# Patient Record
Sex: MALE | Race: WHITE | Employment: UNEMPLOYED | URBAN - METROPOLITAN AREA
[De-identification: names, ages, dates, MRNs, and addresses within clinical notes are randomized per-mention and may not be internally consistent; named-entity substitution may affect disease eponyms.]

---

## 2017-02-17 ENCOUNTER — OFFICE VISIT (OUTPATIENT)
Dept: ENDOCRINOLOGY | Age: 47
End: 2017-02-17

## 2017-02-17 VITALS
HEART RATE: 90 BPM | HEIGHT: 72 IN | BODY MASS INDEX: 38.36 KG/M2 | WEIGHT: 283.2 LBS | DIASTOLIC BLOOD PRESSURE: 88 MMHG | SYSTOLIC BLOOD PRESSURE: 128 MMHG

## 2017-02-17 DIAGNOSIS — E66.01 MORBID OBESITY WITH BMI OF 40.0-44.9, ADULT (HCC): ICD-10-CM

## 2017-02-17 DIAGNOSIS — I10 ESSENTIAL HYPERTENSION, BENIGN: ICD-10-CM

## 2017-02-17 DIAGNOSIS — E78.1 HIGH TRIGLYCERIDES: ICD-10-CM

## 2017-02-17 LAB — HBA1C MFR BLD HPLC: 10.8 %

## 2017-02-17 NOTE — PROGRESS NOTES
Chief Complaint   Patient presents with    Diabetes     pcp and pharmacy confirmed    Other     eye exam is due    Labs     done     History of Present Illness: Pascale Tariq is a 40 y.o. male here for follow up of diabetes. Weight down 23 lbs since last visit in 9/16. Has been back on phentermine 1 tab every morning and may miss a rare dose and has felt this has helped with appetite and energy and mental sharpness. Has been getting both dose of metformin every morning. Was out of work from [facility] until 2/1/17 and while out of work was a little better at getting the evening dose but still only got this 50% of the time. Since being back to work he gets up at 1am and is only sleeping about 4 hours of sleep per night. Hasn't not checked sugar at all in the past few weeks and when he did check it a few times it was in the 200s. Does tend to take 100 units with his morning dose and will take this amount if he remembers his evening dose. Current Outpatient Prescriptions   Medication Sig    lansoprazole (PREVACID) 30 mg capsule TAKE ONE CAPSULE TWICE A DAY BEFORE BREAKFAST AND SUPPER FOR REFLUX    atenolol (TENORMIN) 50 mg tablet TAKE 1 TABLET BY MOUTH DAILY    phentermine (ADIPEX-P) 37.5 mg tablet Take 1 Tab by mouth every morning. Max Daily Amount: 37.5 mg.    indomethacin (INDOCIN) 50 mg capsule TAKE 1 CAPSULE BY MOUTH 3 TIMES DAILY WITH FOOD FOR JOINT PAIN AND INFLAMMATION    NOVOLIN 70/30 100 unit/mL (70-30) injection INJECT 75 UNITS TWO TIMES  A DAY OR AS DIRECTED UP  UNITS DAILY    Insulin Syringe-Needle U-100 (BD INSULIN SYRINGE ULTRA-FINE) 1 mL 31 x 5/16\" syrg USE AS DIRECTED TWICE DAILY    febuxostat (ULORIC) 40 mg tab tablet TAKE 1 TAB BY MOUTH DAILY.  metFORMIN ER (GLUCOPHAGE XR) 500 mg tablet TAKE 1 TABLET TWICE A DAY    ONETOUCH VERIO strip USE TO TEST BLOOD SUGAR TWICE DAILY AS DIRECTED     No current facility-administered medications for this visit.       Allergies Allergen Reactions    Penicillin G Anaphylaxis     \"almost killed me as a child according to my grandma (a nurse)\"     Review of Systems:  - Eyes: no blurry vision or double vision  - Cardiovascular: no chest pain  - Respiratory: no shortness of breath  - Musculoskeletal: no myalgias  - Neurological: no numbness/tingling in extremities    Physical Examination:  Blood pressure 128/88, pulse 90, height 6' (1.829 m), weight 283 lb 3.2 oz (128.5 kg). - General: pleasant, no distress, good eye contact   - Neck: no carotid bruits  - Cardiovascular: regular, normal rate, nl s1 and s2, no m/r/g,   - Respiratory: clear bilaterally  - Integumentary: no edema,   - Psychiatric: normal mood and affect    Data Reviewed:   Component      Latest Ref Rng & Units 2/17/2017          10:40 AM   Hemoglobin A1c (POC)      % 10.8       Assessment/Plan:     1. DM w/o complication type II, uncontrolled: his most recent Hgb A1c was 10.8% in 2/17 down from 12.2% in 9/16 up from 11.9% in 8/16 up from 9.7% in 4/16 down from 9.9% in 4/15 up from 9.3% in 12/14 down from 10.6% in 8/14 up from 8.4% in 4/14 down from 11.2% in 10/13 up from 7.3% in 3/13 down from 7.7% in 1/13 down from 10.2% in August 2012. A1c is still high due to non-compliance with evening dose of insulin even though he has lost 23 lbs so will focus on this. - cont novolin 70/30 insulin 100 units bid   - cont metformin  mg 2 tabs daily  - check bs 2x daily  - optho UTD 6/12  - foot exam done 4/16  - microalbumin nl 10/12, up to 75 in 4/15, down to 13 in 4/16  - check A1c and microalbumin at next visit      2. Hypertension: his BP was at goal < 140/90  - cont atenolol 50 mg daily      3. High triglycerides: Given DM, Goal LDL < 100, non-HDL < 130, and TG < 150.  and non- in 1/13,  and non- in 10/13,  and non- in 4/14, non- in 12/14.  and LDL 81 in 12/14.  in 4/15. LDL 75 and  in 4/16.   LDL 56 and TG 307 in 9/16. Hopefully weight loss has helped  - diet control for now  - check lipids and cmp today and at next visit      4. Obesity: Had lost 36 lbs so far since June 2013 on phentermine but weight up 10 lbs since 8/14. I restarted in 9/16 and wt down 23 lbs by 2/17  - cont phentermine 37.5 mg daily    Patient Instructions   1) Your Hemoglobin A1c is a 3 month marker of your diabetes control. Goal is less than 7% which means your average blood sugar is less than 150. Your Hemoglobin A1c is 10.8% which means your diabetes is under better control than 12.2% at your last check. Continue to work on your diet and exercise and take all your medications (metformin and insulin) as directed. 2) Your have lost 23 lbs since last visit. Keep up the good work. 3) Do your best to get both shots of 100 units everyday to keep your A1c down. 4) I will send you a message through [employer] with your lab results. 5) Your blood pressure was at goal.          Follow-up Disposition:  Return in about 5 months (around 7/17/2017). Copy sent to:  Dr. Dorie Santos via [facility]  Dr. Allie Carmona    Lab follow up: 2/18/17    Component      Latest Ref Rng & Units 2/17/2017 2/17/2017          12:37 PM 12:37 PM   Glucose      65 - 99 mg/dL  318 (H)   BUN      6 - 24 mg/dL  10   Creatinine      0.76 - 1.27 mg/dL  0.77   GFR est non-AA      >59 mL/min/1.73  110   GFR est AA      >59 mL/min/1.73  127   BUN/Creatinine ratio      9 - 20  13   Sodium      134 - 144 mmol/L  138   Potassium      3.5 - 5.2 mmol/L  5.2   Chloride      96 - 106 mmol/L  95 (L)   CO2      18 - 29 mmol/L  27   Calcium      8.7 - 10.2 mg/dL  9.4   Protein, total      6.0 - 8.5 g/dL  6.7   Albumin      3.5 - 5.5 g/dL  4.1   GLOBULIN, TOTAL      1.5 - 4.5 g/dL  2.6   A-G Ratio      1.1 - 2.5  1.6   Bilirubin, total      0.0 - 1.2 mg/dL  0.3   Alk.  phosphatase      39 - 117 IU/L  91   AST      0 - 40 IU/L  29   ALT      0 - 44 IU/L  35   Cholesterol, total 100 - 199 mg/dL 131    Triglyceride      0 - 149 mg/dL 255 (H)    HDL Cholesterol      >39 mg/dL 27 (L)    VLDL, calculated      5 - 40 mg/dL 51 (H)    LDL, calculated      0 - 99 mg/dL 53      Sent him the following message through [employer]:    Total Cholesterol is the total number of cholesterol particles in your blood. Goal is less than 200. Your value is at goal.    Triglycerides are the short term fats in your blood. Goal is less than 150. Your value is above goal.    HDL is the good cholesterol in your blood. Goal is more than 40. Your value is below goal.    LDL is the bad cholesterol in your blood. Goal is less than 100. Your value is at goal.    Continue to follow a low cholesterol diet. Try to limit the amount of fried foods, fatty foods, butter, gravy, red meat, ice cream, cheese, and eggs in your diet, which are all high in cholesterol.   -------------------------------------------------------------------------------------------------------------------  BUN and creatinine are markers of kidney function. Your values are normal.  -------------------------------------------------------------------------------------------------------------------  ALT and AST are markers of liver function.   Your values are normal.

## 2017-02-17 NOTE — PATIENT INSTRUCTIONS
1) Your Hemoglobin A1c is a 3 month marker of your diabetes control. Goal is less than 7% which means your average blood sugar is less than 150. Your Hemoglobin A1c is 10.8% which means your diabetes is under better control than 12.2% at your last check. Continue to work on your diet and exercise and take all your medications (metformin and insulin) as directed. 2) Your have lost 23 lbs since last visit. Keep up the good work. 3) Do your best to get both shots of 100 units everyday to keep your A1c down. 4) I will send you a message through [employer] with your lab results.     5) Your blood pressure was at goal.

## 2017-02-17 NOTE — LETTER
2/17/2017 11:12 AM 
 
Mr. Dejah Garcia [address] [facility] [address] February 17, 2017 RE: Dejah Garcia To Whom It May Concern, This is to certify that Dejah Garcia was seen in the office today for an appointment. Please excuse his absence. Please feel free to contact my office at [phone] if you have any questions or concerns. Thank you for your assistance in this matter.  
 
 
Sincerely, 
 
 
 
Colvin Gaucher, MD

## 2017-02-18 LAB
ALBUMIN SERPL-MCNC: 4.1 G/DL (ref 3.5–5.5)
ALBUMIN/GLOB SERPL: 1.6 {RATIO} (ref 1.1–2.5)
ALP SERPL-CCNC: 91 IU/L (ref 39–117)
ALT SERPL-CCNC: 35 IU/L (ref 0–44)
AST SERPL-CCNC: 29 IU/L (ref 0–40)
BILIRUB SERPL-MCNC: 0.3 MG/DL (ref 0–1.2)
BUN SERPL-MCNC: 10 MG/DL (ref 6–24)
BUN/CREAT SERPL: 13 (ref 9–20)
CALCIUM SERPL-MCNC: 9.4 MG/DL (ref 8.7–10.2)
CHLORIDE SERPL-SCNC: 95 MMOL/L (ref 96–106)
CHOLEST SERPL-MCNC: 131 MG/DL (ref 100–199)
CO2 SERPL-SCNC: 27 MMOL/L (ref 18–29)
CREAT SERPL-MCNC: 0.77 MG/DL (ref 0.76–1.27)
GLOBULIN SER CALC-MCNC: 2.6 G/DL (ref 1.5–4.5)
GLUCOSE SERPL-MCNC: 318 MG/DL (ref 65–99)
HDLC SERPL-MCNC: 27 MG/DL
INTERPRETATION, 910389: NORMAL
LDLC SERPL CALC-MCNC: 53 MG/DL (ref 0–99)
Lab: NORMAL
POTASSIUM SERPL-SCNC: 5.2 MMOL/L (ref 3.5–5.2)
PROT SERPL-MCNC: 6.7 G/DL (ref 6–8.5)
SODIUM SERPL-SCNC: 138 MMOL/L (ref 134–144)
TRIGL SERPL-MCNC: 255 MG/DL (ref 0–149)
VLDLC SERPL CALC-MCNC: 51 MG/DL (ref 5–40)

## 2017-02-22 RX ORDER — INDOMETHACIN 50 MG/1
50 CAPSULE ORAL
Qty: 90 CAP | Refills: 1 | Status: SHIPPED | OUTPATIENT
Start: 2017-02-22 | End: 2017-09-25 | Stop reason: SDUPTHER

## 2017-06-12 DIAGNOSIS — I10 ESSENTIAL HYPERTENSION, BENIGN: ICD-10-CM

## 2017-06-12 RX ORDER — ATENOLOL 50 MG/1
50 TABLET ORAL DAILY
Qty: 90 TAB | Refills: 3 | Status: SHIPPED | OUTPATIENT
Start: 2017-06-12 | End: 2018-06-09 | Stop reason: SDUPTHER

## 2017-06-14 ENCOUNTER — TELEPHONE (OUTPATIENT)
Dept: FAMILY MEDICINE CLINIC | Age: 47
End: 2017-06-14

## 2017-06-16 ENCOUNTER — OFFICE VISIT (OUTPATIENT)
Dept: FAMILY MEDICINE CLINIC | Age: 47
End: 2017-06-16

## 2017-06-16 VITALS
SYSTOLIC BLOOD PRESSURE: 133 MMHG | BODY MASS INDEX: 39.28 KG/M2 | TEMPERATURE: 98 F | DIASTOLIC BLOOD PRESSURE: 87 MMHG | OXYGEN SATURATION: 96 % | HEART RATE: 101 BPM | WEIGHT: 290 LBS | RESPIRATION RATE: 14 BRPM | HEIGHT: 72 IN

## 2017-06-16 DIAGNOSIS — K58.9 COLON SPASM: ICD-10-CM

## 2017-06-16 DIAGNOSIS — M54.50 ACUTE LEFT-SIDED LOW BACK PAIN WITHOUT SCIATICA: Primary | ICD-10-CM

## 2017-06-16 DIAGNOSIS — C64.2 RENAL CELL CANCER, LEFT (HCC): ICD-10-CM

## 2017-06-16 DIAGNOSIS — E66.9 OBESITY, UNSPECIFIED OBESITY SEVERITY, UNSPECIFIED OBESITY TYPE: ICD-10-CM

## 2017-06-16 RX ORDER — DICYCLOMINE HYDROCHLORIDE 10 MG/1
10 CAPSULE ORAL 3 TIMES DAILY
Qty: 90 CAP | Refills: 1 | Status: SHIPPED | OUTPATIENT
Start: 2017-06-16 | End: 2017-07-18

## 2017-06-16 NOTE — PROGRESS NOTES
HPI  Rochelle Heller is a 40 y.o. male who presents for back pain that is unlike his usual low back pain. This is left-sided, more lateral, stabbing. It only happens after he eats dinner. It happens consistently after he eats dinner every evening. Describes it as a stabbing pain that makes him want to bend over. When asked about his diet he said the same thing that he said last time \"normal stuff like hamburgers, chicken tenders, steak, fries\". Went on to say that he eats a lot of bread, a lot of potatoes. He came in for a GI complaints in December that I diagnosis constipation recommended that he take Metamucil fiber supplement. This worked well and resolved his constipation complaint. His bowel pattern recently has been more on the loose stool side. Has the urge to have bowel movement just about every time he eats. Finds that he needs to run to the bathroom. It is watery with some chunks. 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 PMHx:  Past Medical History:   Diagnosis Date    Anxiety 2/23/2010    Arthritis     right knee, kain ankle/feet    Chronic pain     low back    Diabetes (HCC)     Elevated LFT's 2/23/2010    GERD (gastroesophageal reflux disease)     Gout 9/5/2009    High triglycerides 10/24/2012    Hypertension 10/8/2012    Knee pain 2/23/2010    Left kidney mass 2-7-13    noted on CT scan @ [facility]    Morbid obesity ([facility] [address]     ESTHER (obstructive sleep apnea)     Pneumonia as a child    Psychiatric disorder     Renal cell cancer ([facility] [address] 3/2013    s/p partial left nephrectomy    Type II or unspecified type diabetes mellitus without mention of complication, uncontrolled     Unspecified sleep apnea     uses C-PAP       Meds:   Current Outpatient Prescriptions   Medication Sig Dispense Refill    dicyclomine (BENTYL) 10 mg capsule Take 1 Cap by mouth three (3) times daily. 90 Cap 1    atenolol (TENORMIN) 50 mg tablet Take 1 Tab by mouth daily. 90 Tab 3    indomethacin (INDOCIN) 50 mg capsule Take 1 Cap by mouth three (3) times daily as needed. 90 Cap 1    lansoprazole (PREVACID) 30 mg capsule TAKE ONE CAPSULE TWICE A DAY BEFORE BREAKFAST AND SUPPER FOR REFLUX 180 Cap 1    phentermine (ADIPEX-P) 37.5 mg tablet Take 1 Tab by mouth every morning. Max Daily Amount: 37.5 mg. 100 Tab 1    NOVOLIN 70/30 100 unit/mL (70-30) injection INJECT 75 UNITS TWO TIMES  A DAY OR AS DIRECTED UP  UNITS DAILY 180 mL 3    Insulin Syringe-Needle U-100 (BD INSULIN SYRINGE ULTRA-FINE) 1 mL 31 x 5/16\" syrg USE AS DIRECTED TWICE DAILY 200 Syringe 3    febuxostat (ULORIC) 40 mg tab tablet TAKE 1 TAB BY MOUTH DAILY. 90 Tab 4    metFORMIN ER (GLUCOPHAGE XR) 500 mg tablet TAKE 1 TABLET TWICE A  Tab 3    ONETOUCH VERIO strip USE TO TEST BLOOD SUGAR TWICE DAILY AS DIRECTED 200 Strip 3       Allergies: Allergies   Allergen Reactions    Penicillin G Anaphylaxis     \"almost killed me as a child according to my grandma (a nurse)\"       Smoker:  History   Smoking Status    Current Every Day Smoker    Packs/day: 1.00    Years: 26.00    Types: Cigarettes    Last attempt to quit: 2/23/2013   Smokeless Tobacco    Former User     Comment: attempting to quit- none for past 48 hrs per pt        ETOH:   History   Alcohol Use    Yes     Comment: 1 drink a month-rare       FH:   Family History   Problem Relation Age of Onset    Diabetes       on Father's side of family       ROS:   As listed in HPI. In addition:  Constitutional:   No headache, fever, fatigue, weight loss or weight gain      Cardiac:    No chest pain      Resp:   No cough or shortness of breath      Neuro   No loss of consciousness, dizziness, seizures      Physical Exam:  Blood pressure 133/87, pulse (!) 101, temperature 98 °F (36.7 °C), resp. rate 14, height 6' (1.829 m), weight 290 lb (131.5 kg), SpO2 96 %.   GEN: No apparent distress. Alert and oriented and responds to all questions appropriately. NEUROLOGIC:  No focal neurologic deficits. Strength and sensation grossly intact. Coordination and gait grossly intact. EXT: Well perfused. No edema. SKIN: No obvious rashes. Back examined: He has a this repeat lateral erector spinae muscle spasm that is tender to palpation reproduces the exact pain that he describes as happening after dinner. It is not exquisitely tender now. Abdomen examined, no tenderness, normal bowel sounds       Assessment and Plan     Acute low back pain  Pain generator as a muscle. I suspect though that it is linked to his GI problem. He has very little fiber in his diet and he is taking Imodium most days out of the week. I suspect that he has a colonic spasm and that stool can only get through in liquid form. This would explain the urgency and the length to meals. This would also explain why he is only expressing back pain after his largest meal the day. Recommend Metamucil fiber supplement twice daily. He wants to try taking pills or Benefiber because he was not fan of Metamucil. Try Bentyl see if this eases spasm. I do not expect this is going to be a long-term medicine. Need to work on his diet    On the back recommend trying heating pad, stretches. History of renal cell carcinoma on the left. CT scan 2015 showed no progression. He is not following up with his surgeon for monetary reasons. I think it is a good idea to keep an eye on this so will order CT scan for cancer surveillance. If there is no improvement in abdominal pain this may shed light on underlying cause      ICD-10-CM ICD-9-CM    1. Acute left-sided low back pain without sciatica M54.5 724.2    2. Obesity, unspecified obesity severity, unspecified obesity type E66.9 278.00    3. Colon spasm K58.9 564.9 dicyclomine (BENTYL) 10 mg capsule   4. Renal cell cancer, left C64.2 189.0 CT ABD W WO CONT       AVS given.  Pt expressed understanding of instructions

## 2017-06-23 ENCOUNTER — TELEPHONE (OUTPATIENT)
Dept: FAMILY MEDICINE CLINIC | Age: 47
End: 2017-06-23

## 2017-06-23 ENCOUNTER — HOSPITAL ENCOUNTER (OUTPATIENT)
Dept: CT IMAGING | Age: 47
Discharge: HOME | End: 2017-06-23
Attending: FAMILY MEDICINE
Payer: COMMERCIAL

## 2017-06-23 DIAGNOSIS — N28.1 RENAL CYST, LEFT: Primary | ICD-10-CM

## 2017-06-23 DIAGNOSIS — C64.2 RENAL CELL CANCER, LEFT (HCC): ICD-10-CM

## 2017-06-23 LAB — CREAT BLD-MCNC: 0.9 MG/DL (ref 0.6–1.3)

## 2017-06-23 PROCEDURE — 82565 ASSAY OF CREATININE: CPT

## 2017-06-23 PROCEDURE — 74011250636 HC RX REV CODE- 250/636: Performed by: FAMILY MEDICINE

## 2017-06-23 PROCEDURE — 74011636320 HC RX REV CODE- 636/320: Performed by: FAMILY MEDICINE

## 2017-06-23 PROCEDURE — 74160 CT ABDOMEN W/CONTRAST: CPT

## 2017-06-23 RX ORDER — BARIUM SULFATE 20 MG/ML
900 SUSPENSION ORAL
Status: DISCONTINUED | OUTPATIENT
Start: 2017-06-23 | End: 2017-06-23

## 2017-06-23 RX ORDER — SODIUM CHLORIDE 9 MG/ML
50 INJECTION, SOLUTION INTRAVENOUS
Status: COMPLETED | OUTPATIENT
Start: 2017-06-23 | End: 2017-06-23

## 2017-06-23 RX ORDER — SODIUM CHLORIDE 0.9 % (FLUSH) 0.9 %
10 SYRINGE (ML) INJECTION
Status: COMPLETED | OUTPATIENT
Start: 2017-06-23 | End: 2017-06-23

## 2017-06-23 RX ADMIN — IOPAMIDOL 100 ML: 755 INJECTION, SOLUTION INTRAVENOUS at 12:32

## 2017-06-23 RX ADMIN — SODIUM CHLORIDE 50 ML/HR: 900 INJECTION, SOLUTION INTRAVENOUS at 12:32

## 2017-06-23 RX ADMIN — Medication 10 ML: at 12:33

## 2017-06-23 NOTE — TELEPHONE ENCOUNTER
Please notify patient. CT results reviewed. We got the scan for surveillance of renal cell carcinoma. There was a cyst on the left kidney noted on a previous CT scan that is a little bit bigger this time around.   Radiologist is suggesting that we check a renal ultrasound to better characterize the cyst.  I have ordered this

## 2017-06-30 ENCOUNTER — HOSPITAL ENCOUNTER (OUTPATIENT)
Dept: ULTRASOUND IMAGING | Age: 47
Discharge: HOME | End: 2017-06-30
Attending: FAMILY MEDICINE
Payer: COMMERCIAL

## 2017-06-30 ENCOUNTER — TELEPHONE (OUTPATIENT)
Dept: FAMILY MEDICINE CLINIC | Age: 47
End: 2017-06-30

## 2017-06-30 DIAGNOSIS — N28.1 RENAL CYST, LEFT: ICD-10-CM

## 2017-06-30 PROCEDURE — 76775 US EXAM ABDO BACK WALL LIM: CPT

## 2017-06-30 PROCEDURE — 76770 US EXAM ABDO BACK WALL COMP: CPT

## 2017-07-03 ENCOUNTER — TELEPHONE (OUTPATIENT)
Dept: FAMILY MEDICINE CLINIC | Age: 47
End: 2017-07-03

## 2017-07-03 RX ORDER — LANSOPRAZOLE 30 MG/1
CAPSULE, DELAYED RELEASE ORAL
Qty: 180 CAP | Refills: 3 | Status: SHIPPED | OUTPATIENT
Start: 2017-07-03 | End: 2018-06-23 | Stop reason: SDUPTHER

## 2017-07-07 ENCOUNTER — TELEPHONE (OUTPATIENT)
Dept: FAMILY MEDICINE CLINIC | Age: 47
End: 2017-07-07

## 2017-07-11 RX ORDER — METFORMIN HYDROCHLORIDE 500 MG/1
TABLET, EXTENDED RELEASE ORAL
Qty: 180 TAB | Refills: 3 | Status: SHIPPED | OUTPATIENT
Start: 2017-07-11 | End: 2018-07-03 | Stop reason: SDUPTHER

## 2017-07-11 RX ORDER — FEBUXOSTAT 40 MG/1
TABLET, FILM COATED ORAL
Qty: 90 TAB | Refills: 4 | Status: SHIPPED | OUTPATIENT
Start: 2017-07-11 | End: 2018-09-29 | Stop reason: SDUPTHER

## 2017-07-18 ENCOUNTER — OFFICE VISIT (OUTPATIENT)
Dept: ENDOCRINOLOGY | Age: 47
End: 2017-07-18

## 2017-07-18 VITALS
HEART RATE: 78 BPM | DIASTOLIC BLOOD PRESSURE: 84 MMHG | WEIGHT: 288.2 LBS | BODY MASS INDEX: 39.04 KG/M2 | SYSTOLIC BLOOD PRESSURE: 120 MMHG | HEIGHT: 72 IN

## 2017-07-18 DIAGNOSIS — E66.01 MORBID OBESITY WITH BMI OF 40.0-44.9, ADULT (HCC): ICD-10-CM

## 2017-07-18 DIAGNOSIS — E78.1 HIGH TRIGLYCERIDES: ICD-10-CM

## 2017-07-18 DIAGNOSIS — I10 ESSENTIAL HYPERTENSION, BENIGN: ICD-10-CM

## 2017-07-18 LAB — HBA1C MFR BLD HPLC: 9.6 %

## 2017-07-18 RX ORDER — PHENTERMINE HYDROCHLORIDE 37.5 MG/1
37.5 TABLET ORAL
Qty: 100 TAB | Refills: 1 | Status: SHIPPED | OUTPATIENT
Start: 2017-07-18 | End: 2018-01-19

## 2017-07-18 NOTE — LETTER
7/18/2017 11:16 AM 
 
Mr. Chay Salmon [address] [facility] [place] [address] July 18, 2017 RE: Chay Salmon To Whom It May Concern, This is to certify that Chay Salmon was seen in the office today for an appointment. Please excuse his absence. Please feel free to contact my office at [phone] if you have any questions or concerns. Thank you for your assistance in this matter.  
 
 
Sincerely, 
 
 
 
Sam Farooq MD

## 2017-07-18 NOTE — PATIENT INSTRUCTIONS
1) Your Hemoglobin A1c is a 3 month marker of your diabetes control. Goal is less than 7% which means your average blood sugar is less than 150. Your Hemoglobin A1c is 9.6% which means your diabetes is under better control than 10.8% at your last check and is the best it's been in 3 years. Continue to work on your diet and exercise and take all your medications as directed. 2) Do your best to get both shots of 100 units everyday. 3) I will send you a message through [employer] with your lab results.

## 2017-07-18 NOTE — MR AVS SNAPSHOT
Visit Information Date & Time Provider Department Dept. Phone Encounter #  
 7/18/2017 10:30 AM Connie Marshall, [address] [place] [address] Diabetes and Endocrinology [phone] [id number] Follow-up Instructions Return in about 6 months (around 1/18/2018). Upcoming Health Maintenance Date Due Pneumococcal 19-64 Highest Risk (1 of 3 - PCV13) 11/9/1990 DTaP/Tdap/Td series (1 - Tdap) 11/9/1992 EYE EXAM RETINAL OR DILATED Q1 7/10/2013 FOOT EXAM Q1 4/15/2017 MICROALBUMIN Q1 4/15/2017 HEMOGLOBIN A1C Q6M 8/17/2017 INFLUENZA AGE 9 TO ADULT 8/1/2017 LIPID PANEL Q1 2/17/2018 Allergies as of 7/18/2017  Review Complete On: 7/18/2017 By: Connie Marshall MD  
  
 Severity Noted Reaction Type Reactions Penicillin G High 09/05/2009   Systemic Anaphylaxis \"almost killed me as a child according to my grandma (a nurse)\" Current Immunizations  Reviewed on 12/8/2014 Name Date Influenza Vaccine 11/5/2013 Influenza Vaccine Claven [place]) 12/8/2014 Influenza Vaccine Split 9/24/2012 Not reviewed this visit You Were Diagnosed With   
  
 Codes Comments Uncontrolled type 2 diabetes mellitus with complication, with long-term current use of insulin (HCC)    -  Primary ICD-10-CM: E11.8, E11.65, Z79.4 ICD-9-CM: 250.82, V58.67 Essential hypertension, benign     ICD-10-CM: I10 
ICD-9-CM: 401.1 High triglycerides     ICD-10-CM: E78.1 ICD-9-CM: 272.1 Morbid obesity with BMI of 40.0-44.9, adult (HCC)     ICD-10-CM: E66.01, Z68.41 
ICD-9-CM: 278.01, V85.41 Vitals BP Pulse Height(growth percentile) Weight(growth percentile) BMI Smoking Status 120/84 78 6' (1.829 m) 288 lb 3.2 oz (130.7 kg) 39.09 kg/m2 Current Every Day Smoker Vitals History BMI and BSA Data Body Mass Index Body Surface Area 39.09 kg/m 2 2.58 m 2 Preferred Pharmacy Pharmacy Name Phone [id number] [facility] [phone] Your Updated Medication List  
  
   
This list is accurate as of: 7/18/17 11:17 AM.  Always use your most recent med list.  
  
  
  
  
 atenolol 50 mg tablet Commonly known as:  TENORMIN Take 1 Tab by mouth daily. febuxostat 40 mg Tab tablet Commonly known as:  ULORIC  
TAKE 1 TAB BY MOUTH DAILY. indomethacin 50 mg capsule Commonly known as:  INDOCIN Take 1 Cap by mouth three (3) times daily as needed. Insulin Syringe-Needle U-100 1 mL 31 gauge x 5/16 Syrg Commonly known as:  BD INSULIN SYRINGE ULTRA-FINE  
USE AS DIRECTED TWICE DAILY  
  
 lansoprazole 30 mg capsule Commonly known as:  PREVACID TAKE ONE CAPSULE TWICE A DAY BEFORE BREAKFAST AND SUPPER FOR REFLUX  
  
 metFORMIN  mg tablet Commonly known as:  GLUCOPHAGE XR  
TAKE 1 TABLET BY MOUTH TWICE A DAY NovoLIN 70/30 100 unit/mL (70-30) injection Generic drug:  insulin NPH/insulin regular INJECT 75 UNITS TWO TIMES  A DAY OR AS DIRECTED UP  UNITS DAILY  
  
 ONETOUCH VERIO strip Generic drug:  glucose blood VI test strips USE TO TEST BLOOD SUGAR TWICE DAILY AS DIRECTED  
  
 phentermine 37.5 mg tablet Commonly known as:  ADIPEX-P Take 1 Tab by mouth every morning. Max Daily Amount: 37.5 mg.  
  
  
  
  
Prescriptions Printed Refills  
 phentermine (ADIPEX-P) 37.5 mg tablet 1 Sig: Take 1 Tab by mouth every morning. Max Daily Amount: 37.5 mg.  
 Class: Print Route: Oral  
  
We Performed the Following AMB POC HEMOGLOBIN A1C [96151 CPT(R)] LIPID PANEL [21369 CPT(R)] METABOLIC PANEL, COMPREHENSIVE [81895 CPT(R)] MICROALBUMIN, UR, RAND W/ MICROALBUMIN/CREA RATIO [id number] CPT(R)] NC COLLECTION VENOUS BLOOD,VENIPUNCTURE [id number] CPT(R)] NC HANDLG&/OR CONVEY OF SPEC FOR TR OFFICE TO LAB [21663 CPT(R)] Follow-up Instructions Return in about 6 months (around 1/18/2018). Patient Instructions 1) Your Hemoglobin A1c is a 3 month marker of your diabetes control. Goal is less than 7% which means your average blood sugar is less than 150. Your Hemoglobin A1c is 9.6% which means your diabetes is under better control than 10.8% at your last check and is the best it's been in 3 years. Continue to work on your diet and exercise and take all your medications as directed. 2) Do your best to get both shots of 100 units everyday. 3) I will send you a message through [employer] with your lab results. Introducing [facility]! Dear Jamaal Castillo: Thank you for requesting a [employer] account. Our records indicate that you already have an active [employer] account. You can access your account anytime at [email or web address] Did you know that you can access your hospital and ER discharge instructions at any time in [employer]? You can also review all of your test results from your hospital stay or ER visit. Additional Information If you have questions, please visit the Frequently Asked Questions section of the [employer] website at [email or web address] Remember, [employer] is NOT to be used for urgent needs. For medical emergencies, dial 911. Now available from your iPhone and Android! Please provide this summary of care documentation to your next provider. Your primary care clinician is listed as Jerald Croft. If you have any questions after today's visit, please call [phone].

## 2017-07-18 NOTE — PROGRESS NOTES
Chief Complaint   Patient presents with    Diabetes     pcp and pharmacy confirmed     History of Present Illness: Tee Park is a 40 y.o. male here for follow up of diabetes. Weight up 5 lbs since last visit in 2/17. Has had some intermittent left sided abdominal and flank pain and had a CT scan and ultrasound that showed a 1.6 cm left kidney cyst and some fatty liver changes but no other findings. Has been trying to increase his fiber intake and this has helped with the pain. Has been working most of the past 5 months. Finished his February job around [employer] [facility] [employer] and was off a few weeks and since then has been back to work in [place] and is working 4 10 hour days and leaves around 3 am every morning. Has been out of the phentermine the past month. Still taking the 100 units of insulin in the morning and only getting the evening dose about 50% of the time while working at the last job but with the current job has been better at getting the evening dose. Has still been taking 2 tabs of metformin every morning. Hasn't checked sugars in the past few months but will still take his meds. Current Outpatient Prescriptions   Medication Sig    metFORMIN ER (GLUCOPHAGE XR) 500 mg tablet TAKE 1 TABLET BY MOUTH TWICE A DAY    febuxostat (ULORIC) 40 mg tab tablet TAKE 1 TAB BY MOUTH DAILY.  lansoprazole (PREVACID) 30 mg capsule TAKE ONE CAPSULE TWICE A DAY BEFORE BREAKFAST AND SUPPER FOR REFLUX    atenolol (TENORMIN) 50 mg tablet Take 1 Tab by mouth daily.  indomethacin (INDOCIN) 50 mg capsule Take 1 Cap by mouth three (3) times daily as needed.  phentermine (ADIPEX-P) 37.5 mg tablet Take 1 Tab by mouth every morning.  Max Daily Amount: 37.5 mg.    NOVOLIN 70/30 100 unit/mL (70-30) injection INJECT 75 UNITS TWO TIMES  A DAY OR AS DIRECTED UP  UNITS DAILY    Insulin Syringe-Needle U-100 (BD INSULIN SYRINGE ULTRA-FINE) 1 mL 31 x 5/16\" syrg USE AS DIRECTED TWICE DAILY    ONETOUCH VERIO strip USE TO TEST BLOOD SUGAR TWICE DAILY AS DIRECTED     No current facility-administered medications for this visit. Allergies   Allergen Reactions    Penicillin G Anaphylaxis     \"almost killed me as a child according to my grandma (a nurse)\"     Review of Systems:  - Eyes: no blurry vision or double vision  - Cardiovascular: no chest pain  - Respiratory: no shortness of breath  - Musculoskeletal: no myalgias  - Neurological: occ numbness/tingling in extremities    Physical Examination:  Blood pressure 120/84, pulse 78, height 6' (1.829 m), weight 288 lb 3.2 oz (130.7 kg). - General: pleasant, no distress, good eye contact   - Neck: no carotid bruits  - Cardiovascular: regular, normal rate, nl s1 and s2, no m/r/g,   - Respiratory: clear bilaterally  - Integumentary: no edema,   - Psychiatric: normal mood and affect         Diabetic foot exam performed by Shanelle Matthews MD     Measurement  Response Nurse Comment Physician Comment   Monofilament  R - normal sensation with micro filament  L - normal sensation with micro filament     Pulse DP R - 2+ (normal)  L - 2+ (normal)     Vibration R - normal  L - normal     Structural deformity R - None  L - None     Skin Integrity / Deformity R - Mild - callus  L - Mild - callus        Reviewed by:                 Data Reviewed: Component      Latest Ref Rng & Units 7/18/2017          10:52 AM   Hemoglobin A1c (POC)      % 9.6       Assessment/Plan:     1. DM w/o complication type II, uncontrolled: his most recent Hgb A1c was 9.6% in 7/17 down from 10.8% in 2/17 down from 12.2% in 9/16 up from 11.9% in 8/16 up from 9.7% in 4/16 down from 9.9% in 4/15 up from 9.3% in 12/14 down from 10.6% in 8/14 up from 8.4% in 4/14 down from 11.2% in 10/13 up from 7.3% in 3/13 down from 7.7% in 1/13 down from 10.2% in August 2012. A1c is still high due to non-compliance with evening dose of insulin but is the best it's been in 3 years so praised him for this.   Will continue to focus on compliance. - cont novolin 70/30 insulin 100 units bid   - cont metformin  mg 2 tabs daily  - check bs 2x daily  - optho UTD 6/12--due now  - foot exam done 7/17  - microalbumin nl 10/12, up to 75 in 4/15, down to 13 in 4/16  - check microalbumin today  - check POC A1c at next visit      2. Hypertension: his BP was at goal < 140/90  - cont atenolol 50 mg daily      3. High triglycerides: Given DM, Goal LDL < 100, non-HDL < 130, and TG < 150.  and non- in 1/13,  and non- in 10/13,  and non- in 4/14, non- in 12/14.  and LDL 81 in 12/14.  in 4/15. LDL 75 and  in 4/16. LDL 56 and  in 9/16. LDL 53 and  with weight loss  - diet control for now  - check lipids and cmp today and at next visit      4. Obesity: Had lost 36 lbs so far since June 2013 on phentermine but weight up 10 lbs since 8/14. I restarted in 9/16 and wt down 23 lbs by 2/17 but up 5 lbs in 7/17 but has been out for 1 month. - cont phentermine 37.5 mg daily      Patient Instructions   1) Your Hemoglobin A1c is a 3 month marker of your diabetes control. Goal is less than 7% which means your average blood sugar is less than 150. Your Hemoglobin A1c is 9.6% which means your diabetes is under better control than 10.8% at your last check and is the best it's been in 3 years. Continue to work on your diet and exercise and take all your medications as directed. 2) Do your best to get both shots of 100 units everyday. 3) I will send you a message through [facility] with your lab results. Follow-up Disposition:  Return in about 6 months (around 1/18/2018).     Copy sent to:  Dr. Pérez De Paz via [facility]  Dr. Tran Stevens follow up: 7/22/17  Component      Latest Ref Rng & Units 7/18/2017 7/18/2017 7/18/2017           2:08 PM  2:08 PM  2:08 PM   Glucose      65 - 99 mg/dL  325 (H)    BUN      6 - 24 mg/dL  11    Creatinine      0.76 - 1.27 mg/dL 0.75 (L)    GFR est non-AA      >59 mL/min/1.73  111    GFR est AA      >59 mL/min/1.73  128    BUN/Creatinine ratio      9 - 20  15    Sodium      134 - 144 mmol/L  133 (L)    Potassium      3.5 - 5.2 mmol/L  5.5 (H)    Chloride      96 - 106 mmol/L  92 (L)    CO2      18 - 29 mmol/L  26    Calcium      8.7 - 10.2 mg/dL  9.9    Protein, total      6.0 - 8.5 g/dL  7.5    Albumin      3.5 - 5.5 g/dL  4.4    GLOBULIN, TOTAL      1.5 - 4.5 g/dL  3.1    A-G Ratio      1.2 - 2.2  1.4    Bilirubin, total      0.0 - 1.2 mg/dL  0.3    Alk. phosphatase      39 - 117 IU/L  90    AST      0 - 40 IU/L  32    ALT (SGPT)      0 - 44 IU/L  38    Cholesterol, total      100 - 199 mg/dL 163     Triglyceride      0 - 149 mg/dL 342 (H)     HDL Cholesterol      >39 mg/dL 29 (L)     VLDL, calculated      5 - 40 mg/dL 68 (H)     LDL, calculated      0 - 99 mg/dL 66     Creatinine, urine      Not Estab. mg/dL   59.3   Microalbumin, urine      Not Estab. ug/mL   29.8   Microalbumin/Creat. Ratio      0.0 - 30.0 mg/g creat   50.3 (H)     Sent him the following message in a letter: Total Cholesterol is the total number of cholesterol particles in your blood. Goal is less than 200. Your value is at goal.    Triglycerides are the short term fats in your blood. Goal is less than 150. Your value is above goal.    HDL is the good cholesterol in your blood. Goal is more than 40. Your value is below goal.    LDL is the bad cholesterol in your blood. Goal is less than 100. Your value is at goal.    Continue to follow a low cholesterol diet. Try to limit the amount of fried foods, fatty foods, butter, gravy, red meat, ice cream, cheese, and eggs in your diet, which are all high in cholesterol.   -------------------------------------------------------------------------------------------------------------------  BUN and creatinine are markers of kidney function.   Your values are normal. Although your creatinine is low, I'm not concerned about this as I only worry if your creatinine is high.  -------------------------------------------------------------------------------------------------------------------  ALT and AST are markers of liver function. Your values are normal.  -------------------------------------------------------------------------------------------------------------------  Microalbumin/creatinine ratio is a marker of the amount of protein in your urine. Goal is less than 30. Your value is abnormal at 50 but previously was high at 75 in April 2015 but then came back to normal on repeat. This indicates that your kidneys are possibly being slightly affected by your uncontrolled diabetes and/or blood pressure and if your protein is still high at the next check, you will benefit from starting a medication called lisinopril to help protect your kidneys from the effects of diabetes and high blood pressure.

## 2017-07-19 LAB
ALBUMIN SERPL-MCNC: 4.4 G/DL (ref 3.5–5.5)
ALBUMIN/CREAT UR: 50.3 MG/G CREAT (ref 0–30)
ALBUMIN/GLOB SERPL: 1.4 {RATIO} (ref 1.2–2.2)
ALP SERPL-CCNC: 90 IU/L (ref 39–117)
ALT SERPL-CCNC: 38 IU/L (ref 0–44)
AST SERPL-CCNC: 32 IU/L (ref 0–40)
BILIRUB SERPL-MCNC: 0.3 MG/DL (ref 0–1.2)
BUN SERPL-MCNC: 11 MG/DL (ref 6–24)
BUN/CREAT SERPL: 15 (ref 9–20)
CALCIUM SERPL-MCNC: 9.9 MG/DL (ref 8.7–10.2)
CHLORIDE SERPL-SCNC: 92 MMOL/L (ref 96–106)
CHOLEST SERPL-MCNC: 163 MG/DL (ref 100–199)
CO2 SERPL-SCNC: 26 MMOL/L (ref 18–29)
CREAT SERPL-MCNC: 0.75 MG/DL (ref 0.76–1.27)
CREAT UR-MCNC: 59.3 MG/DL
GLOBULIN SER CALC-MCNC: 3.1 G/DL (ref 1.5–4.5)
GLUCOSE SERPL-MCNC: 325 MG/DL (ref 65–99)
HDLC SERPL-MCNC: 29 MG/DL
INTERPRETATION, 910389: NORMAL
LDLC SERPL CALC-MCNC: 66 MG/DL (ref 0–99)
Lab: NORMAL
MICROALBUMIN UR-MCNC: 29.8 UG/ML
POTASSIUM SERPL-SCNC: 5.5 MMOL/L (ref 3.5–5.2)
PROT SERPL-MCNC: 7.5 G/DL (ref 6–8.5)
SODIUM SERPL-SCNC: 133 MMOL/L (ref 134–144)
TRIGL SERPL-MCNC: 342 MG/DL (ref 0–149)
VLDLC SERPL CALC-MCNC: 68 MG/DL (ref 5–40)

## 2017-09-12 DIAGNOSIS — K58.9 COLON SPASM: ICD-10-CM

## 2017-09-12 RX ORDER — DICYCLOMINE HYDROCHLORIDE 10 MG/1
CAPSULE ORAL
Qty: 90 CAP | Refills: 1 | Status: SHIPPED | OUTPATIENT
Start: 2017-09-12 | End: 2017-10-16 | Stop reason: SDUPTHER

## 2017-09-25 RX ORDER — INDOMETHACIN 50 MG/1
50 CAPSULE ORAL
Qty: 90 CAP | Refills: 1 | Status: SHIPPED | OUTPATIENT
Start: 2017-09-25 | End: 2017-12-18 | Stop reason: SDUPTHER

## 2017-09-25 NOTE — TELEPHONE ENCOUNTER
Chief Complaint   Patient presents with    Medication Refill     Last refill:   7 months ago (2/22/2017)        indomethacin (INDOCIN) 50 mg capsule       Take 1 Cap by mouth three (3) times daily as needed.       Dispense: 90 Cap     Refills: 1     Start: 2/22/2017     By: Cornell Perla MD       Future Appointments:  1/19/2018  10:30 AM   Darien Tovar MD      [facility] [place]      Last Appointment With Me:  6/16/2017      Last Appointment My Department:  6/16/2017

## 2017-10-13 RX ORDER — CALCIUM CARB/VITAMIN D3/VIT K1 500-100-40
TABLET,CHEWABLE ORAL
Qty: 200 SYRINGE | Refills: 3 | Status: SHIPPED | OUTPATIENT
Start: 2017-10-13 | End: 2019-06-26 | Stop reason: SDUPTHER

## 2017-10-13 NOTE — TELEPHONE ENCOUNTER
Future Appointments  Date Time Provider Luis Eduardo Haideri   1/19/2018 10:30 AM Love Murcia MD [facility] [address] [facility]                          Last Appointment My Department:  7/18/2017    Please advise of refill below.    Requested Prescriptions     Pending Prescriptions Disp Refills    Insulin Syringe-Needle U-100 (BD INSULIN SYRINGE ULTRA-FINE) 1 mL 31 gauge x 5/16 syrg 200 Syringe 3     Sig: USE AS DIRECTED TWICE DAILY

## 2017-10-16 DIAGNOSIS — K58.9 COLON SPASM: ICD-10-CM

## 2017-10-16 RX ORDER — DICYCLOMINE HYDROCHLORIDE 10 MG/1
10 CAPSULE ORAL 3 TIMES DAILY
Qty: 90 CAP | Refills: 1 | Status: SHIPPED | OUTPATIENT
Start: 2017-10-16 | End: 2017-10-31 | Stop reason: SDUPTHER

## 2017-10-16 NOTE — TELEPHONE ENCOUNTER
Chief Complaint   Patient presents with    Medication Refill     Last refill:   4 weeks ago (9/12/2017)        dicyclomine (BENTYL) 10 mg capsule       TAKE ONE CAPSULE BY MOUTH 3 TIMES A DAY       Dispense: 90 Cap     Refills: 1     Start: 9/12/2017     By: Jer Mcginnis MD       Future Appointments:  1/19/2018  10:30 AM   Colvin Gaucher, MD      [facility] [address] Appointment With Me:  6/16/2017      Last Appointment My Department:  6/16/2017

## 2017-10-18 DIAGNOSIS — K58.9 COLON SPASM: ICD-10-CM

## 2017-10-18 RX ORDER — DICYCLOMINE HYDROCHLORIDE 10 MG/1
10 CAPSULE ORAL 3 TIMES DAILY
Qty: 90 CAP | Refills: 1 | OUTPATIENT
Start: 2017-10-18

## 2017-10-31 DIAGNOSIS — K58.9 COLON SPASM: ICD-10-CM

## 2017-10-31 RX ORDER — DICYCLOMINE HYDROCHLORIDE 10 MG/1
10 CAPSULE ORAL 3 TIMES DAILY
Qty: 90 CAP | Refills: 1 | Status: SHIPPED | OUTPATIENT
Start: 2017-10-31 | End: 2017-11-01 | Stop reason: SDUPTHER

## 2017-10-31 NOTE — TELEPHONE ENCOUNTER
Chief Complaint   Patient presents with    Medication Refill     Last refill:   2 weeks ago (10/16/2017)        dicyclomine (BENTYL) 10 mg capsule       Take 1 Cap by mouth three (3) times daily.       Dispense: 90 Cap     Refills: 1     Start: 10/16/2017     By: Kvng Fang MD       Future Appointments:  1/19/2018  10:30 AM   Antonio Crowe MD      [facility] [place]      Last Appointment With Me:  6/16/2017      Last Appointment My Department:  6/16/2017

## 2017-11-01 ENCOUNTER — TELEPHONE (OUTPATIENT)
Dept: FAMILY MEDICINE CLINIC | Age: 47
End: 2017-11-01

## 2017-11-01 DIAGNOSIS — K58.9 COLON SPASM: ICD-10-CM

## 2017-11-01 RX ORDER — DICYCLOMINE HYDROCHLORIDE 10 MG/1
10 CAPSULE ORAL 3 TIMES DAILY
Qty: 270 CAP | Refills: 3
Start: 2017-11-01 | End: 2018-01-19

## 2017-11-01 NOTE — TELEPHONE ENCOUNTER
Rx changed to [facility] and canceled at [facility] per Dr. Brandy Sesay verbal order 90 day supply with 3 refills called into [facility] and chart updated

## 2017-11-21 RX ORDER — INDOMETHACIN 50 MG/1
CAPSULE ORAL
Qty: 90 CAP | Refills: 1 | Status: SHIPPED | OUTPATIENT
Start: 2017-11-21 | End: 2018-01-19

## 2017-12-19 RX ORDER — INDOMETHACIN 50 MG/1
CAPSULE ORAL
Qty: 90 CAP | Refills: 1 | Status: SHIPPED | OUTPATIENT
Start: 2017-12-19 | End: 2018-02-13 | Stop reason: SDUPTHER

## 2017-12-22 RX ORDER — HUMAN INSULIN 100 [IU]/ML
INJECTION, SUSPENSION SUBCUTANEOUS
Qty: 180 ML | Refills: 3 | Status: SHIPPED | OUTPATIENT
Start: 2017-12-22 | End: 2019-01-11

## 2018-01-19 ENCOUNTER — OFFICE VISIT (OUTPATIENT)
Dept: ENDOCRINOLOGY | Age: 48
End: 2018-01-19

## 2018-01-19 VITALS
WEIGHT: 280.6 LBS | HEART RATE: 84 BPM | HEIGHT: 72 IN | DIASTOLIC BLOOD PRESSURE: 72 MMHG | BODY MASS INDEX: 38.01 KG/M2 | SYSTOLIC BLOOD PRESSURE: 136 MMHG

## 2018-01-19 DIAGNOSIS — E66.01 MORBID OBESITY WITH BMI OF 40.0-44.9, ADULT (HCC): ICD-10-CM

## 2018-01-19 DIAGNOSIS — E78.1 HIGH TRIGLYCERIDES: ICD-10-CM

## 2018-01-19 DIAGNOSIS — I10 ESSENTIAL HYPERTENSION, BENIGN: ICD-10-CM

## 2018-01-19 DIAGNOSIS — E11.21 TYPE 2 DIABETES MELLITUS WITH NEPHROPATHY (HCC): Primary | ICD-10-CM

## 2018-01-19 RX ORDER — PHENTERMINE HYDROCHLORIDE 37.5 MG/1
TABLET ORAL
Qty: 100 TAB | Refills: 1
Start: 2018-01-19 | End: 2018-03-11 | Stop reason: SDUPTHER

## 2018-01-19 NOTE — PATIENT INSTRUCTIONS
1) Try taking the 1/2 of phentermine once a day either at 9 or noon to see if you can tolerate this better. 2) I will call you or send you a letter with your lab results.

## 2018-01-19 NOTE — PROGRESS NOTES
Chief Complaint   Patient presents with    Diabetes     pcp and pharmacy confirmed    Other     eye exam is due     History of Present Illness: Christiano Espinal is a 56 y.o. male here for follow up of diabetes. Weight down 8 lbs since last visit in 7/17. 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 Has still been getting the morning dose of insulin consistently but may only get the evening dose 3-4 times a week. Still hasn't checked hardly at all. Getting both tabs of metformin every morning. Went back on phentermine after last visit but had some days where he felt a \"rush\" like he was high on something so he decided to stop and has been off this the past 2-3 months but is willing to try 1/2 tab once daily. Current Outpatient Prescriptions   Medication Sig    NOVOLIN 70/30 100 unit/mL (70-30) injection INJECT 75 UNITS TWICE A DAY OR AS DIRECTED UP  UNITS A DAY    indomethacin (INDOCIN) 50 mg capsule TAKE ONE CAPSULE BY MOUTH 3 TIMES A DAY AS NEEDED    Insulin Syringe-Needle U-100 (BD INSULIN SYRINGE ULTRA-FINE) 1 mL 31 gauge x 5/16 syrg USE AS DIRECTED TWICE DAILY    metFORMIN ER (GLUCOPHAGE XR) 500 mg tablet TAKE 1 TABLET BY MOUTH TWICE A DAY    febuxostat (ULORIC) 40 mg tab tablet TAKE 1 TAB BY MOUTH DAILY.  lansoprazole (PREVACID) 30 mg capsule TAKE ONE CAPSULE TWICE A DAY BEFORE BREAKFAST AND SUPPER FOR REFLUX    atenolol (TENORMIN) 50 mg tablet Take 1 Tab by mouth daily.  ONETOUCH VERIO strip USE TO TEST BLOOD SUGAR TWICE DAILY AS DIRECTED     No current facility-administered medications for this visit.       Allergies   Allergen Reactions    Penicillin G Anaphylaxis     \"almost killed me as a child according to my grandma (a nurse)\"     Review of Systems:  - Eyes: no blurry vision or double vision  - Cardiovascular: no chest pain  - Respiratory: no shortness of breath  - Musculoskeletal: no myalgias  - Neurological: no numbness/tingling in extremities    Physical Examination:  Blood pressure 136/72, pulse 84, height 6' (1.829 m), weight 280 lb 9.6 oz (127.3 kg). - General: pleasant, no distress, good eye contact   - Neck: no carotid bruits  - Cardiovascular: regular, normal rate, nl s1 and s2, no m/r/g,   - Respiratory: clear bilaterally  - Integumentary: no edema,   - Psychiatric: normal mood and affect    Data Reviewed:   - none new for review    Assessment/Plan:     1. DM w/o complication type II, uncontrolled: his most recent Hgb A1c was 9.6% in 7/17 down from 10.8% in 2/17 down from 12.2% in 9/16 up from 11.9% in 8/16 up from 9.7% in 4/16 down from 9.9% in 4/15 up from 9.3% in 12/14 down from 10.6% in 8/14 up from 8.4% in 4/14 down from 11.2% in 10/13 up from 7.3% in 3/13 down from 7.7% in 1/13 down from 10.2% in August 2012. A1c will likely still be high due to missing evening doses 50% of the time so still focus on this before making any changes. - cont novolin 70/30 insulin 100 units bid   - cont metformin  mg 2 tabs daily  - check bs 2x daily  - optho UTD 6/12--due now  - foot exam done 7/17  - microalbumin nl 10/12, up to 75 in 4/15, down to 13 in 4/16, up to 50.3 in 7/17  - check A1c, cmp and microalbumin today      2. Hypertension: his BP was at goal < 140/90  - cont atenolol 50 mg daily      3. High triglycerides: Given DM, Goal LDL < 100, non-HDL < 130, and TG < 150.  and non- in 1/13,  and non- in 10/13,  and non- in 4/14, non- in 12/14.  and LDL 81 in 12/14.  in 4/15. LDL 75 and  in 4/16. LDL 56 and  in 9/16. LDL 53 and  with weight loss. LDL 66 and  in 7/17  - diet control for now  - check lipids today      4. Obesity: Had lost 36 lbs so far since June 2013 on phentermine but weight up 10 lbs since 8/14. I restarted in 9/16 and wt down 23 lbs by 2/17 but up 5 lbs in 7/17 but had been out for 1 month.   Restarted and had a \"rush\" sensation so he stopped this but wt down 8 lbs by 1/18. Will restart at 1/2 tab daily  - restart phentermine 37.5 mg 1/2 tab daily      Patient Instructions   1) Try taking the 1/2 of phentermine once a day either at 9 or noon to see if you can tolerate this better. 2) I will call you or send you a letter with your lab results. Follow-up Disposition:  Return in about 6 months (around 7/19/2018). Copy sent to:  Dr. Crowe Has via [facility]  Dr. Kristen Henao    Lab follow up: 1/20/18    Sent him the following message in a letter:    Resulted Orders   HEMOGLOBIN A1C WITH EAG   Result Value Ref Range    Hemoglobin A1c 8.5 (H) 4.8 - 5.6 %      Comment:               Pre-diabetes: 5.7 - 6.4           Diabetes: >6.4           Glycemic control for adults with diabetes: <7.0      Estimated average glucose 197 mg/dL    Narrative    Performed at:  [address]  [id number]  : Yosvany Underwood MD, Phone:  [phone]   MICROALBUMIN, UR, RAND W/ MICROALBUMIN/CREA RATIO   Result Value Ref Range    Creatinine, urine 102.9 Not Estab. mg/dL    Microalbumin, urine 13.5 Not Estab. ug/mL    Microalb/Creat ratio (ug/mg creat.) 13.1 0.0 - 30.0 mg/g creat    Narrative    Performed at:  [address]  [id number]  : Yosvany Underwood MD, Phone:  [phone]   METABOLIC PANEL, COMPREHENSIVE   Result Value Ref Range    Glucose 273 (H) 65 - 99 mg/dL    BUN 15 6 - 24 mg/dL    Creatinine 0.87 0.76 - 1.27 mg/dL    GFR est non- >59 mL/min/1.73    GFR est  >59 mL/min/1.73    BUN/Creatinine ratio 17 9 - 20    Sodium 138 134 - 144 mmol/L    Potassium 5.0 3.5 - 5.2 mmol/L    Chloride 97 96 - 106 mmol/L    CO2 25 18 - 29 mmol/L    Calcium 9.2 8.7 - 10.2 mg/dL    Protein, total 7.0 6.0 - 8.5 g/dL    Albumin 4.1 3.5 - 5.5 g/dL    GLOBULIN, TOTAL 2.9 1.5 - 4.5 g/dL    A-G Ratio 1.4 1.2 - 2.2    Bilirubin, total <0.2 0.0 - 1.2 mg/dL    Alk.  phosphatase 78 39 - 117 IU/L    AST (SGOT) 24 0 - 40 IU/L    ALT (SGPT) 38 0 - 44 IU/L    Narrative    Performed at:  [address]  [id number]  : Boo Lawrence MD, Phone:  [phone]   LIPID PANEL   Result Value Ref Range    Cholesterol, total 138 100 - 199 mg/dL    Triglyceride 160 (H) 0 - 149 mg/dL    HDL Cholesterol 31 (L) >39 mg/dL    VLDL, calculated 32 5 - 40 mg/dL    LDL, calculated 75 0 - 99 mg/dL    Narrative    Performed at:  [address]  [id number]  : Boo Lawrence MD, Phone:  [phone]   CVD REPORT   Result Value Ref Range    INTERPRETATION Note       Comment:      Supplemental report is available. Narrative    Performed at:  [facility] [address]  [id number]  : Trinity Antoine PhD, Phone:  [phone]   DIABETES PATIENT EDUCATION   Result Value Ref Range    PDF Image Not applicable     Narrative    Performed at:  [facility] [place]  [address] [place] [address]  [id number]  : Trinity Antoine PhD, Phone:  [phone]       I wanted to update you on your recent lab results:    Hemoglobin A1c is a 3 month marker of your diabetes control. Goal is less than 7% which means your average blood sugar is less than 150. Your Hemoglobin A1c is 8.5% which means your diabetes is under better control than 9.6% at your last check and is the best it's been in 4 years. Hopefully with getting your evening dose of insulin, your A1c will be even better next time. Continue to work on your diet and exercise and take all your medications as directed.  -------------------------------------------------------------------------------------------------------------------  Total Cholesterol is the total number of cholesterol particles in your blood. Goal is less than 200.   Your value is at goal.    Triglycerides are the short term fats in your blood. Goal is less than 150. Your value is just above goal.    HDL is the good cholesterol in your blood. Goal is more than 40. Your value is below goal.    LDL is the bad cholesterol in your blood. Goal is less than 100. Your value is at goal.    Continue to follow a low cholesterol diet. Try to limit the amount of fried foods, fatty foods, butter, gravy, red meat, ice cream, cheese, and eggs in your diet, which are all high in cholesterol.   -------------------------------------------------------------------------------------------------------------------  BUN and creatinine are markers of kidney function. Your values are normal.  -------------------------------------------------------------------------------------------------------------------  ALT and AST are markers of liver function. Your values are normal.  -------------------------------------------------------------------------------------------------------------------  Microalbumin/creatinine ratio is a marker of the amount of protein in your urine. Goal is less than 30. Your value is normal. This indicates that your kidneys are not being affected by your uncontrolled diabetes and/or blood pressure.

## 2018-01-19 NOTE — MR AVS SNAPSHOT
[address] [facility] [address] [id number] [phone] Patient: Lena Guaman MRN: [id number] :1970 Visit Information Date & Time Provider Department Dept. Phone Encounter #  
 2018 10:30 AM Toshia Louis, [address] [place] [address] Diabetes and Endocrinology [phone] [id number] Follow-up Instructions Return in about 6 months (around 2018). Upcoming Health Maintenance Date Due Pneumococcal 19-64 Highest Risk (1 of 3 - PCV13) 1990 DTaP/Tdap/Td series (1 - Tdap) 1992 EYE EXAM RETINAL OR DILATED Q1 7/10/2013 Influenza Age 5 to Adult 2017 HEMOGLOBIN A1C Q6M 2018 FOOT EXAM Q1 2018 MICROALBUMIN Q1 2018 LIPID PANEL Q1 2018 Allergies as of 2018  Review Complete On: 2018 By: Toshia Louis MD  
  
 Severity Noted Reaction Type Reactions Penicillin G High 2009   Systemic Anaphylaxis \"almost killed me as a child according to my grandma (a nurse)\" Current Immunizations  Reviewed on 2014 Name Date Influenza Vaccine 2013 Influenza Vaccine Meri Haines) 2014 Influenza Vaccine Split 2012 Not reviewed this visit You Were Diagnosed With   
  
 Codes Comments Type 2 diabetes mellitus with nephropathy ([facility]    -  Primary ICD-10-CM: E11.21 
ICD-9-CM: 250.40, 583.81 Morbid obesity with BMI of 40.0-44.9, adult (HCC)     ICD-10-CM: E66.01, Z68.41 
ICD-9-CM: 278.01, V85.41 High triglycerides     ICD-10-CM: E78.1 ICD-9-CM: 272.1 Essential hypertension, benign     ICD-10-CM: I10 
ICD-9-CM: 401.1 Vitals BP Pulse Height(growth percentile) Weight(growth percentile) BMI Smoking Status 136/72 84 6' (1.829 m) 280 lb 9.6 oz (127.3 kg) 38.06 kg/m2 Current Every Day Smoker Vitals History BMI and BSA Data Body Mass Index Body Surface Area  38.06 kg/m 2 2.54 m 2  
  
  
 Preferred Pharmacy Pharmacy Name Phone [facility]/PHARMACY #[id number] [facility] [phone] Your Updated Medication List  
  
   
This list is accurate as of: 1/19/18 11:13 AM.  Always use your most recent med list.  
  
  
  
  
 atenolol 50 mg tablet Commonly known as:  TENORMIN Take 1 Tab by mouth daily. febuxostat 40 mg Tab tablet Commonly known as:  ULORIC  
TAKE 1 TAB BY MOUTH DAILY. indomethacin 50 mg capsule Commonly known as:  INDOCIN  
TAKE ONE CAPSULE BY MOUTH 3 TIMES A DAY AS NEEDED Insulin Syringe-Needle U-100 1 mL 31 gauge x 5/16 Syrg Commonly known as:  BD INSULIN SYRINGE ULTRA-FINE  
USE AS DIRECTED TWICE DAILY  
  
 lansoprazole 30 mg capsule Commonly known as:  PREVACID TAKE ONE CAPSULE TWICE A DAY BEFORE BREAKFAST AND SUPPER FOR REFLUX  
  
 metFORMIN  mg tablet Commonly known as:  GLUCOPHAGE XR  
TAKE 1 TABLET BY MOUTH TWICE A DAY NovoLIN 70/30 100 unit/mL (70-30) injection Generic drug:  insulin NPH/insulin regular INJECT 75 UNITS TWICE A DAY OR AS DIRECTED UP  UNITS A DAY  
  
 ONETOUCH VERIO strip Generic drug:  glucose blood VI test strips USE TO TEST BLOOD SUGAR TWICE DAILY AS DIRECTED  
  
 phentermine 37.5 mg tablet Commonly known as:  ADIPEX-P Take 1/2 tab every morning--Dose change 1/18/18--updated med list--did not send prescription to the pharmacy We Performed the Following HEMOGLOBIN A1C WITH EAG [55967 CPT(R)] LIPID PANEL [49785 CPT(R)] METABOLIC PANEL, COMPREHENSIVE [34577 CPT(R)] MICROALBUMIN, UR, RAND W/ MICROALBUMIN/CREA RATIO [id number] CPT(R)] GA COLLECTION VENOUS BLOOD,VENIPUNCTURE [id number] CPT(R)] GA HANDLG&/OR CONVEY OF SPEC FOR TR OFFICE TO LAB [57732 CPT(R)] Follow-up Instructions Return in about 6 months (around 7/19/2018). Patient Instructions 1) Try taking the 1/2 of phentermine once a day either at 9 or noon to see if you can tolerate this better. 2) I will call you or send you a letter with your lab results. Introducing [facility] & HEALTH SERVICES! Dear Chelsea: Thank you for requesting a [employer] account. Our records indicate that you already have an active [employer] account. You can access your account anytime at [email or web address]. [email or web address] Did you know that you can access your hospital and ER discharge instructions at any time in [employer]? You can also review all of your test results from your hospital stay or ER visit. Additional Information If you have questions, please visit the Frequently Asked Questions section of the [employer] website at [email or web address] Remember, [employer] is NOT to be used for urgent needs. For medical emergencies, dial 911. Now available from your iPhone and Android! Please provide this summary of care documentation to your next provider. Your primary care clinician is listed as Anu Swartz. If you have any questions after today's visit, please call [phone].

## 2018-01-20 LAB
ALBUMIN SERPL-MCNC: 4.1 G/DL (ref 3.5–5.5)
ALBUMIN/CREAT UR: 13.1 MG/G CREAT (ref 0–30)
ALBUMIN/GLOB SERPL: 1.4 {RATIO} (ref 1.2–2.2)
ALP SERPL-CCNC: 78 IU/L (ref 39–117)
ALT SERPL-CCNC: 38 IU/L (ref 0–44)
AST SERPL-CCNC: 24 IU/L (ref 0–40)
BILIRUB SERPL-MCNC: <0.2 MG/DL (ref 0–1.2)
BUN SERPL-MCNC: 15 MG/DL (ref 6–24)
BUN/CREAT SERPL: 17 (ref 9–20)
CALCIUM SERPL-MCNC: 9.2 MG/DL (ref 8.7–10.2)
CHLORIDE SERPL-SCNC: 97 MMOL/L (ref 96–106)
CHOLEST SERPL-MCNC: 138 MG/DL (ref 100–199)
CO2 SERPL-SCNC: 25 MMOL/L (ref 18–29)
CREAT SERPL-MCNC: 0.87 MG/DL (ref 0.76–1.27)
CREAT UR-MCNC: 102.9 MG/DL
EST. AVERAGE GLUCOSE BLD GHB EST-MCNC: 197 MG/DL
GLOBULIN SER CALC-MCNC: 2.9 G/DL (ref 1.5–4.5)
GLUCOSE SERPL-MCNC: 273 MG/DL (ref 65–99)
HBA1C MFR BLD: 8.5 % (ref 4.8–5.6)
HDLC SERPL-MCNC: 31 MG/DL
INTERPRETATION, 910389: NORMAL
LDLC SERPL CALC-MCNC: 75 MG/DL (ref 0–99)
Lab: NORMAL
MICROALBUMIN UR-MCNC: 13.5 UG/ML
POTASSIUM SERPL-SCNC: 5 MMOL/L (ref 3.5–5.2)
PROT SERPL-MCNC: 7 G/DL (ref 6–8.5)
SODIUM SERPL-SCNC: 138 MMOL/L (ref 134–144)
TRIGL SERPL-MCNC: 160 MG/DL (ref 0–149)
VLDLC SERPL CALC-MCNC: 32 MG/DL (ref 5–40)

## 2018-02-13 RX ORDER — INDOMETHACIN 50 MG/1
CAPSULE ORAL
Qty: 90 CAP | Refills: 1 | Status: SHIPPED | OUTPATIENT
Start: 2018-02-13 | End: 2018-04-10 | Stop reason: SDUPTHER

## 2018-03-11 DIAGNOSIS — E66.01 MORBID OBESITY WITH BMI OF 40.0-44.9, ADULT (HCC): ICD-10-CM

## 2018-03-11 RX ORDER — PHENTERMINE HYDROCHLORIDE 37.5 MG/1
TABLET ORAL
Qty: 100 TAB | Refills: 1 | Status: SHIPPED | OUTPATIENT
Start: 2018-03-11 | End: 2018-08-15 | Stop reason: SDUPTHER

## 2018-04-10 RX ORDER — INDOMETHACIN 50 MG/1
CAPSULE ORAL
Qty: 90 CAP | Refills: 1 | Status: SHIPPED | OUTPATIENT
Start: 2018-04-10 | End: 2018-06-16 | Stop reason: SDUPTHER

## 2018-06-09 DIAGNOSIS — I10 ESSENTIAL HYPERTENSION, BENIGN: ICD-10-CM

## 2018-06-11 RX ORDER — ATENOLOL 50 MG/1
TABLET ORAL
Qty: 90 TAB | Refills: 3 | Status: SHIPPED | OUTPATIENT
Start: 2018-06-11 | End: 2019-05-31 | Stop reason: SDUPTHER

## 2018-06-18 RX ORDER — INDOMETHACIN 50 MG/1
CAPSULE ORAL
Qty: 90 CAP | Refills: 1 | Status: SHIPPED | OUTPATIENT
Start: 2018-06-18 | End: 2018-08-16 | Stop reason: SDUPTHER

## 2018-06-26 RX ORDER — LANSOPRAZOLE 30 MG/1
CAPSULE, DELAYED RELEASE ORAL
Qty: 180 CAP | Refills: 3 | Status: SHIPPED | OUTPATIENT
Start: 2018-06-26 | End: 2019-06-18 | Stop reason: SDUPTHER

## 2018-07-03 RX ORDER — METFORMIN HYDROCHLORIDE 500 MG/1
TABLET, EXTENDED RELEASE ORAL
Qty: 180 TAB | Refills: 3 | Status: SHIPPED | OUTPATIENT
Start: 2018-07-03 | End: 2019-06-18 | Stop reason: SDUPTHER

## 2018-07-20 ENCOUNTER — OFFICE VISIT (OUTPATIENT)
Dept: ENDOCRINOLOGY | Age: 48
End: 2018-07-20

## 2018-07-20 VITALS
HEIGHT: 72 IN | SYSTOLIC BLOOD PRESSURE: 127 MMHG | DIASTOLIC BLOOD PRESSURE: 85 MMHG | WEIGHT: 272.6 LBS | HEART RATE: 74 BPM | BODY MASS INDEX: 36.92 KG/M2

## 2018-07-20 DIAGNOSIS — I10 ESSENTIAL HYPERTENSION, BENIGN: ICD-10-CM

## 2018-07-20 DIAGNOSIS — E78.1 HIGH TRIGLYCERIDES: ICD-10-CM

## 2018-07-20 DIAGNOSIS — E11.21 TYPE 2 DIABETES MELLITUS WITH NEPHROPATHY (HCC): Primary | ICD-10-CM

## 2018-07-20 DIAGNOSIS — E66.01 MORBID OBESITY WITH BMI OF 40.0-44.9, ADULT (HCC): ICD-10-CM

## 2018-07-20 LAB — HBA1C MFR BLD HPLC: 8.7 %

## 2018-07-20 NOTE — PATIENT INSTRUCTIONS
1) You have lost 8 lbs since last visit. Keep up the good work. 2) Your A1c was 8.7% up slightly from 8.5% but still down from 10-12% over the past 2 years.

## 2018-07-20 NOTE — MR AVS SNAPSHOT
[address] [facility] [address] [id number] [phone] Patient: Massiel Alas MRN: [id number] :1970 Visit Information Date & Time Provider Department Dept. Phone Encounter #  
 2018 10:30 AM Balaji Veliz [address] Diabetes and Endocrinology [phone] [id number] Follow-up Instructions Return in about 6 months (around 2019). Upcoming Health Maintenance Date Due Pneumococcal 19-64 Highest Risk (1 of 3 - PCV13) 1990 DTaP/Tdap/Td series (1 - Tdap) 1992 EYE EXAM RETINAL OR DILATED Q1 7/10/2013 FOOT EXAM Q1 2018 HEMOGLOBIN A1C Q6M 2018 Influenza Age 5 to Adult 2018 MICROALBUMIN Q1 2019 LIPID PANEL Q1 2019 Allergies as of 2018  Review Complete On: 2018 By: Alexandro Cunha MD  
  
 Severity Noted Reaction Type Reactions Penicillin G High 2009   Systemic Anaphylaxis \"almost killed me as a child according to my grandma (a nurse)\" Current Immunizations  Reviewed on 2014 Name Date Influenza Vaccine 2013 Influenza Vaccine Michelle Phill) 2014 Influenza Vaccine Split 2012 Not reviewed this visit You Were Diagnosed With   
  
 Codes Comments Type 2 diabetes mellitus with nephropathy ([email or web address]    -  Primary ICD-10-CM: E11.21 
ICD-9-CM: 250.40, 583.81 Essential hypertension, benign     ICD-10-CM: I10 
ICD-9-CM: 401.1 High triglycerides     ICD-10-CM: E78.1 ICD-9-CM: 272.1 Morbid obesity with BMI of 40.0-44.9, adult (HCC)     ICD-10-CM: E66.01, Z68.41 
ICD-9-CM: 278.01, V85.41 Vitals BP Pulse Height(growth percentile) Weight(growth percentile) BMI Smoking Status 127/85 (BP 1 Location: Left arm, BP Patient Position: Sitting) 74 6' (1.829 m) 272 lb 9.6 oz (123.7 kg) 36.97 kg/m2 Current Every Day Smoker Vitals History BMI and BSA Data Body Mass Index Body Surface Area  
 36.97 kg/m 2 2.51 m 2 Preferred Pharmacy Pharmacy Name Phone [facility]/PHARMACY #[id number] [facility] [phone] Your Updated Medication List  
  
   
This list is accurate as of 7/20/18 11:32 AM.  Always use your most recent med list.  
  
  
  
  
 atenolol 50 mg tablet Commonly known as:  TENORMIN  
TAKE 1 TAB BY MOUTH DAILY. febuxostat 40 mg Tab tablet Commonly known as:  ULORIC  
TAKE 1 TAB BY MOUTH DAILY. indomethacin 50 mg capsule Commonly known as:  INDOCIN  
TAKE ONE CAPSULE BY MOUTH 3 TIMES A DAY AS NEEDED Insulin Syringe-Needle U-100 1 mL 31 gauge x 5/16 Syrg Commonly known as:  BD INSULIN SYRINGE ULTRA-FINE  
USE AS DIRECTED TWICE DAILY  
  
 lansoprazole 30 mg capsule Commonly known as:  PREVACID TAKE ONE CAPSULE TWICE A DAY BEFORE BREAKFAST AND SUPPER FOR REFLUX  
  
 metFORMIN  mg tablet Commonly known as:  GLUCOPHAGE XR  
TAKE 1 TABLET BY MOUTH TWICE A DAY NovoLIN 70/30 U-100 Insulin 100 unit/mL (70-30) injection Generic drug:  insulin NPH/insulin regular INJECT 75 UNITS TWICE A DAY OR AS DIRECTED UP  UNITS A DAY  
  
 ONETOUCH VERIO strip Generic drug:  glucose blood VI test strips USE TO TEST BLOOD SUGAR TWICE DAILY AS DIRECTED  
  
 phentermine 37.5 mg tablet Commonly known as:  ADIPEX-P  
TAKE ONE TABLET BY MOUTH EVERY MORNING **MAX DAILY AMOUNT 1 TABLET We Performed the Following AMB POC HEMOGLOBIN A1C [89801 CPT(R)] Follow-up Instructions Return in about 6 months (around 1/20/2019). Patient Instructions 1) You have lost 8 lbs since last visit. Keep up the good work. 2) Your A1c was 8.7% up slightly from 8.5% but still down from 10-12% over the past 2 years. Introducing [facility] & HE[facility] SERVICES! Dear Rudi Kawasaki: Thank you for requesting a [employer] account.   Our records indicate that you already have an active [employer] account. You can access your account anytime at [email or web address]. [email or web address] Did you know that you can access your hospital and ER discharge instructions at any time in [employer]? You can also review all of your test results from your hospital stay or ER visit. Additional Information If you have questions, please visit the Frequently Asked Questions section of the [employer] website at [email or web address] Remember, [employer] is NOT to be used for urgent needs. For medical emergencies, dial 911. Now available from your iPhone and Android! Please provide this summary of care documentation to your next provider. Your primary care clinician is listed as Santana Dennis. If you have any questions after today's visit, please call [phone].

## 2018-07-20 NOTE — PROGRESS NOTES
Chief Complaint   Patient presents with    Diabetes     PCP and pharmacy verified     History of Present Illness: Loretta Pat is a 56 y.o. male here for follow up of diabetes. Weight down 8 lbs since last visit in 1/18. Has mostly been taking a whole phentermine since last visit but occ will take 1/2 tab if he is experiencing a \"rush\" and will do this for a few days and then will go back to a whole tab daily. Switched from a project in [place] to one in [facility] about 5 weeks ago and now is leaving his house at 2am.  As a results is taking 100 units at this time and then none later in the day as he has to be in bed by 8pm but hasn't had any trouble with low sugars doing this. Hasn't checked his sugar much in the past 6 months but has been taking the pills as directed. Current Outpatient Prescriptions   Medication Sig    metFORMIN ER (GLUCOPHAGE XR) 500 mg tablet TAKE 1 TABLET BY MOUTH TWICE A DAY    lansoprazole (PREVACID) 30 mg capsule TAKE ONE CAPSULE TWICE A DAY BEFORE BREAKFAST AND SUPPER FOR REFLUX    indomethacin (INDOCIN) 50 mg capsule TAKE ONE CAPSULE BY MOUTH 3 TIMES A DAY AS NEEDED    atenolol (TENORMIN) 50 mg tablet TAKE 1 TAB BY MOUTH DAILY.  phentermine (ADIPEX-P) 37.5 mg tablet TAKE ONE TABLET BY MOUTH EVERY MORNING **MAX DAILY AMOUNT 1 TABLET    NOVOLIN 70/30 100 unit/mL (70-30) injection INJECT 75 UNITS TWICE A DAY OR AS DIRECTED UP  UNITS A DAY    Insulin Syringe-Needle U-100 (BD INSULIN SYRINGE ULTRA-FINE) 1 mL 31 gauge x 5/16 syrg USE AS DIRECTED TWICE DAILY    febuxostat (ULORIC) 40 mg tab tablet TAKE 1 TAB BY MOUTH DAILY.  ONETOUCH VERIO strip USE TO TEST BLOOD SUGAR TWICE DAILY AS DIRECTED     No current facility-administered medications for this visit.       Allergies   Allergen Reactions    Penicillin G Anaphylaxis     \"almost killed me as a child according to my grandma (a nurse)\"     Review of Systems:  - Eyes: no blurry vision or double vision  - Cardiovascular: no chest pain  - Respiratory: no shortness of breath  - Musculoskeletal: no myalgias  - Neurological: occ numbness/tingling in extremities    Physical Examination:  Blood pressure 127/85, pulse 74, height 6' (1.829 m), weight 272 lb 9.6 oz (123.7 kg). - General: pleasant, no distress, good eye contact   - Neck: no carotid bruits  - Cardiovascular: regular, normal rate, nl s1 and s2, no m/r/g,   - Respiratory: clear bilaterally  - Integumentary: no edema,   - Psychiatric: normal mood and affect    Diabetic foot exam:     Left Foot:   Visual Exam: callous - mild   Pulse DP: 2+ (normal)   Filament test: normal sensation    Vibratory sensation: normal      Right Foot:   Visual Exam: callous - mild   Pulse DP: 2+ (normal)   Filament test: normal sensation    Vibratory sensation: normal        Data Reviewed:   Component      Latest Ref Rng & Units 7/20/2018          11:13 AM   Hemoglobin A1c (POC)      % 8.7       Assessment/Plan:     1. DM w/o complication type II, uncontrolled: his most recent Hgb A1c was 8.7% in 7/18 up from 8.5% in 1/18 down from 9.6% in 7/17 down from 10.8% in 2/17 down from 12.2% in 9/16 up from 11.9% in 8/16 up from 9.7% in 4/16 down from 9.9% in 4/15 up from 9.3% in 12/14 down from 10.6% in 8/14 up from 8.4% in 4/14 down from 11.2% in 10/13 up from 7.3% in 3/13 down from 7.7% in 1/13 down from 10.2% in August 2012. A1c still up but stable from last time despite not getting both doses of insulin everyday. - cont novolin 70/30 insulin 100 units bid   - cont metformin  mg 2 tabs daily  - check bs 2x daily  - optho UTD 6/12--due now  - foot exam done 7/18  - microalbumin nl 10/12, up to 75 in 4/15, down to 13 in 4/16, up to 50.3 in 7/17, down to 13 in 1/18  - check A1c, cmp and microalbumin at next visit      2. Hypertension: his BP was at goal < 140/90  - cont atenolol 50 mg daily      3. High triglycerides: Given DM, Goal LDL < 100, non-HDL < 130, and TG < 150.    and non- in 1/13,  and non- in 10/13,  and non- in 4/14, non- in 12/14.  and LDL 81 in 12/14.  in 4/15. LDL 75 and  in 4/16. LDL 56 and  in 9/16. LDL 53 and  with weight loss. LDL 66 and  in 7/17. LDL 75 and  in 7/18  - diet control for now  - check lipids at next visit      4. Obesity: Had lost 36 lbs so far since June 2013 on phentermine but weight up 10 lbs since 8/14. I restarted in 9/16 and wt down 23 lbs by 2/17 but up 5 lbs in 7/17 but had been out for 1 month. Restarted and had a \"rush\" sensation so he stopped this but wt down 8 lbs by 1/18. Restarted 1/2 tab daily and worked back up to 1 tab daily and wt down 8 lbs by 7/18  - cont phentermine 37.5 mg 1 tab daily      Patient Instructions   1) You have lost 8 lbs since last visit. Keep up the good work. 2) Your A1c was 8.7% up slightly from 8.5% but still down from 10-12% over the past 2 years. Follow-up Disposition:  Return in about 6 months (around 1/20/2019).     Copy sent to:  Dr. Nora Monteiro via [facility] care  Dr. Alva Patel

## 2018-08-15 DIAGNOSIS — E66.01 MORBID OBESITY WITH BMI OF 40.0-44.9, ADULT (HCC): ICD-10-CM

## 2018-08-15 RX ORDER — PHENTERMINE HYDROCHLORIDE 37.5 MG/1
TABLET ORAL
Qty: 90 TAB | Refills: 1 | Status: SHIPPED | OUTPATIENT
Start: 2018-08-15 | End: 2019-01-30 | Stop reason: SDUPTHER

## 2018-08-16 RX ORDER — INDOMETHACIN 50 MG/1
CAPSULE ORAL
Qty: 90 CAP | Refills: 1 | Status: SHIPPED | OUTPATIENT
Start: 2018-08-16 | End: 2018-08-23 | Stop reason: RX

## 2018-08-23 ENCOUNTER — TELEPHONE (OUTPATIENT)
Dept: FAMILY MEDICINE CLINIC | Age: 48
End: 2018-08-23

## 2018-08-23 RX ORDER — DICLOFENAC SODIUM 75 MG/1
75 TABLET, DELAYED RELEASE ORAL
Qty: 60 TAB | Refills: 2 | Status: SHIPPED | OUTPATIENT
Start: 2018-08-23 | End: 2019-01-11

## 2018-10-01 RX ORDER — FEBUXOSTAT 40 MG/1
TABLET ORAL
Qty: 90 TAB | Refills: 4 | Status: SHIPPED | OUTPATIENT
Start: 2018-10-01 | End: 2019-12-18

## 2019-01-11 ENCOUNTER — OFFICE VISIT (OUTPATIENT)
Dept: ENDOCRINOLOGY | Age: 49
End: 2019-01-11

## 2019-01-11 VITALS
HEIGHT: 72 IN | SYSTOLIC BLOOD PRESSURE: 134 MMHG | WEIGHT: 275.2 LBS | HEART RATE: 72 BPM | BODY MASS INDEX: 37.27 KG/M2 | DIASTOLIC BLOOD PRESSURE: 92 MMHG

## 2019-01-11 DIAGNOSIS — I10 ESSENTIAL HYPERTENSION, BENIGN: ICD-10-CM

## 2019-01-11 DIAGNOSIS — E11.65 UNCONTROLLED TYPE 2 DIABETES MELLITUS WITH HYPERGLYCEMIA (HCC): Primary | ICD-10-CM

## 2019-01-11 DIAGNOSIS — E78.1 HIGH TRIGLYCERIDES: ICD-10-CM

## 2019-01-11 DIAGNOSIS — E66.01 MORBID OBESITY WITH BMI OF 40.0-44.9, ADULT (HCC): ICD-10-CM

## 2019-01-11 LAB — HBA1C MFR BLD HPLC: 9.8 %

## 2019-01-11 NOTE — PROGRESS NOTES
Chief Complaint   Patient presents with    Diabetes     pcp and pharmacy confirmed    Other     eye exam is due    Labs     done     History of Present Illness: Chay Luis Antonio is a 53 y.o. male here for follow up of diabetes. Weight up 3 lbs since last visit in 7/18. Has transitioned back to the job in [place] and works 6 days a week and still takes 100 units in the morning and none at night. Willing to try taking 50 units at dinner as this is his biggest meal.  Has been getting both tabs of metformin together in the morning. Still alternating between 1/2 tab and a whole tab of phentermine based on feeling more jittery at times. Hasn't taken his atenolol this morning. Occ shortness of breath with congestion. No chest pain. Current Outpatient Medications   Medication Sig    ULORIC 40 mg tab tablet TAKE 1 TABLET BY MOUTH DAILY    phentermine (ADIPEX-P) 37.5 mg tablet TAKE ONE TABLET BY MOUTH EVERY MORNING    metFORMIN ER (GLUCOPHAGE XR) 500 mg tablet TAKE 1 TABLET BY MOUTH TWICE A DAY    lansoprazole (PREVACID) 30 mg capsule TAKE ONE CAPSULE TWICE A DAY BEFORE BREAKFAST AND SUPPER FOR REFLUX    atenolol (TENORMIN) 50 mg tablet TAKE 1 TAB BY MOUTH DAILY.  NOVOLIN 70/30 100 unit/mL (70-30) injection INJECT 75 UNITS TWICE A DAY OR AS DIRECTED UP  UNITS A DAY    Insulin Syringe-Needle U-100 (BD INSULIN SYRINGE ULTRA-FINE) 1 mL 31 gauge x 5/16 syrg USE AS DIRECTED TWICE DAILY    ONETOUCH VERIO strip USE TO TEST BLOOD SUGAR TWICE DAILY AS DIRECTED     No current facility-administered medications for this visit.       Allergies   Allergen Reactions    Penicillin G Anaphylaxis     \"almost killed me as a child according to my grandma (a nurse)\"     Review of Systems:  - Eyes: no blurry vision or double vision  - Cardiovascular: no chest pain  - Respiratory: no shortness of breath  - Musculoskeletal: no myalgias  - Neurological: no numbness/tingling in extremities    Physical Examination:  Blood pressure (!) 134/92, pulse 72, height 6' (1.829 m), weight 275 lb 3.2 oz (124.8 kg). - General: pleasant, no distress, good eye contact   - Neck: no carotid bruits  - Cardiovascular: regular, normal rate, nl s1 and s2, no m/r/g,   - Respiratory: clear bilaterally  - Integumentary: no edema,   - Psychiatric: normal mood and affect    Data Reviewed:   Component      Latest Ref Rng & Units 1/11/2019           9:38 AM   Hemoglobin A1c (POC)      % 9.8       Assessment/Plan:     1. DM w/o complication type II, uncontrolled: his most recent Hgb A1c was 9.8% in 1/19 up from 8.7% in 7/18 up from 8.5% in 1/18 down from 9.6% in 7/17 down from 10.8% in 2/17 down from 12.2% in 9/16 up from 11.9% in 8/16 up from 9.7% in 4/16 down from 9.9% in 4/15 up from 9.3% in 12/14 down from 10.6% in 8/14 up from 8.4% in 4/14 down from 11.2% in 10/13 up from 7.3% in 3/13 down from 7.7% in 1/13 down from 10.2% in August 2012. A1c still up due to not getting both doses of insulin everyday so will try taking 50 units at dinner.  - cont novolin 70/30 insulin 100 units in am but take 50 units at dinner   - cont metformin  mg 2 tabs daily  - check bs 2x daily  - optho UTD 6/12--due now  - foot exam done 7/18  - microalbumin nl 10/12, up to 75 in 4/15, down to 13 in 4/16, up to 50.3 in 7/17, down to 13 in 1/18  - check cmp and microalbumin today  - check POC A1c at next visit        2. Hypertension: his BP was at goal < 140/90  - cont atenolol 50 mg daily      3. High triglycerides: Given DM, Goal LDL < 100, non-HDL < 130, and TG < 150.  and non- in 1/13,  and non- in 10/13,  and non- in 4/14, non- in 12/14.  and LDL 81 in 12/14.  in 4/15. LDL 75 and  in 4/16. LDL 56 and  in 9/16. LDL 53 and  with weight loss. LDL 66 and  in 7/17. LDL 75 and  in 7/18  - diet control for now  - check lipids today      4.   Obesity: Had lost 39 lbs so far since June 2013 on phentermine but weight up 10 lbs since 8/14. I restarted in 9/16 and wt down 23 lbs by 2/17 but up 5 lbs in 7/17 but had been out for 1 month. Restarted and had a \"rush\" sensation so he stopped this but wt down 8 lbs by 1/18. Restarted 1/2 tab daily and worked back up to 1 tab daily and wt down 8 lbs by 7/18. Up 3 lbs by 1/19.  - cont phentermine 37.5 mg 1 tab daily      Patient Instructions   1) I will call you or send you a letter with your lab results. 2) Your A1c michael from 8.7% to 9.8%. 3) Please try taking 50 units before dinner to see if this gets your A1c down. If you feel any low sugars in the morning with taking this dose and the 100 units at 2-3 am, then decrease the dinner dose to 40 units. 4) Your blood pressure was up but you didn't take atenolol today. Follow-up Disposition:  Return in about 6 months (around 7/11/2019).     Copy sent to:  Dr. Modesto Chairez via [facility]  Dr. Shahriar Crockett follow up: 1/12/19    Sent him the following message in a letter:    MICROALBUMIN, UR, RAND W/ MICROALB/CREAT RATIO   Result Value Ref Range    Creatinine, urine 99.1 Not Estab. mg/dL    Microalbumin, urine 12.8 Not Estab. ug/mL    Microalb/Creat ratio (ug/mg creat.) 12.9 0.0 - 30.0 mg/g creat      Comment:                           Normal:                0.0 -  30.0                       Albuminuria:          31.0 - 300.0                       Clinical albuminuria:       >300.0      Narrative    Performed at:  [address]  [id number]  : Gia Hughes MD, Phone:  [phone]   METABOLIC PANEL, COMPREHENSIVE   Result Value Ref Range    Glucose 215 (H) 65 - 99 mg/dL    BUN 11 6 - 24 mg/dL    Creatinine 0.75 (L) 0.76 - 1.27 mg/dL    GFR est non- >59 mL/min/1.73    GFR est  >59 mL/min/1.73    BUN/Creatinine ratio 15 9 - 20    Sodium 139 134 - 144 mmol/L    Potassium 5.3 (H) 3.5 - 5.2 mmol/L    Chloride 98 96 - 106 mmol/L    CO2 25 20 - 29 mmol/L    Calcium 9.1 8.7 - 10.2 mg/dL    Protein, total 6.6 6.0 - 8.5 g/dL    Albumin 4.2 3.5 - 5.5 g/dL    GLOBULIN, TOTAL 2.4 1.5 - 4.5 g/dL    A-G Ratio 1.8 1.2 - 2.2    Bilirubin, total 0.3 0.0 - 1.2 mg/dL    Alk. phosphatase 73 39 - 117 IU/L    AST (SGOT) 35 0 - 40 IU/L    ALT (SGPT) 54 (H) 0 - 44 IU/L    Narrative    Performed at:  [address]  [id number]  : George López MD, Phone:  [phone]   LIPID PANEL   Result Value Ref Range    Cholesterol, total 127 100 - 199 mg/dL    Triglyceride 108 0 - 149 mg/dL    HDL Cholesterol 31 (L) >39 mg/dL    VLDL, calculated 22 5 - 40 mg/dL    LDL, calculated 74 0 - 99 mg/dL    Narrative    Performed at:  [address]  [id number]  : George López MD, Phone:  [phone]   CVD REPORT   Result Value Ref Range    INTERPRETATION Note       Comment:      Supplemental report is available. Narrative    Performed at:  [address] [place] [address]  [id number]  : Lisa Marcano MD, Phone:  [phone]   DIABETES PATIENT EDUCATION   Result Value Ref Range    PDF Image Not applicable     Narrative    Performed at:  [address] [place] [address]  [id number]  : Lisa Marcano MD, Phone:  [phone]       I wanted to update you on your recent lab results:    Hemoglobin A1c is a 3 month marker of your diabetes control. Goal is less than 7%. Continue to work on your diet and exercise and take all your medications as directed.  -------------------------------------------------------------------------------------------------------------------  Total Cholesterol is the total number of cholesterol particles in your blood. Goal is less than 200. Triglycerides are the short term fats in your blood. Goal is less than 150.       HDL is the good cholesterol in your blood. Goal is more than 50 if you are a woman and 40 if you are a man. LDL is the bad cholesterol in your blood. Goal is less than 100 unless you have heart disease and then goal is under 70. Continue to follow a low cholesterol diet. Try to limit the amount of fried foods, fatty foods, butter, gravy, red meat, ice cream, cheese, and eggs in your diet, which are all high in cholesterol.  -------------------------------------------------------------------------------------------------------------------  BUN and creatinine are markers of kidney function. Your values are normal.  Although your creatinine is low, I'm not concerned about this as I only worry if your creatinine is high.  -------------------------------------------------------------------------------------------------------------------  ALT and AST are markers of liver function. Your values are normal.  -------------------------------------------------------------------------------------------------------------------  Microalbumin/creatinine ratio is a marker of the amount of protein in your urine. Goal is less than 30. Your value is normal. This indicates that your kidneys are not being affected by your uncontrolled diabetes and/or blood pressure.

## 2019-01-11 NOTE — PATIENT INSTRUCTIONS
1) I will call you or send you a letter with your lab results. 2) Your A1c michael from 8.7% to 9.8%. 3) Please try taking 50 units before dinner to see if this gets your A1c down. If you feel any low sugars in the morning with taking this dose and the 100 units at 2-3 am, then decrease the dinner dose to 40 units. 4) Your blood pressure was up but you didn't take atenolol today.

## 2019-01-12 LAB
ALBUMIN SERPL-MCNC: 4.2 G/DL (ref 3.5–5.5)
ALBUMIN/CREAT UR: 12.9 MG/G CREAT (ref 0–30)
ALBUMIN/GLOB SERPL: 1.8 {RATIO} (ref 1.2–2.2)
ALP SERPL-CCNC: 73 IU/L (ref 39–117)
ALT SERPL-CCNC: 54 IU/L (ref 0–44)
AST SERPL-CCNC: 35 IU/L (ref 0–40)
BILIRUB SERPL-MCNC: 0.3 MG/DL (ref 0–1.2)
BUN SERPL-MCNC: 11 MG/DL (ref 6–24)
BUN/CREAT SERPL: 15 (ref 9–20)
CALCIUM SERPL-MCNC: 9.1 MG/DL (ref 8.7–10.2)
CHLORIDE SERPL-SCNC: 98 MMOL/L (ref 96–106)
CHOLEST SERPL-MCNC: 127 MG/DL (ref 100–199)
CO2 SERPL-SCNC: 25 MMOL/L (ref 20–29)
CREAT SERPL-MCNC: 0.75 MG/DL (ref 0.76–1.27)
CREAT UR-MCNC: 99.1 MG/DL
GLOBULIN SER CALC-MCNC: 2.4 G/DL (ref 1.5–4.5)
GLUCOSE SERPL-MCNC: 215 MG/DL (ref 65–99)
HDLC SERPL-MCNC: 31 MG/DL
INTERPRETATION, 910389: NORMAL
LDLC SERPL CALC-MCNC: 74 MG/DL (ref 0–99)
Lab: NORMAL
MICROALBUMIN UR-MCNC: 12.8 UG/ML
POTASSIUM SERPL-SCNC: 5.3 MMOL/L (ref 3.5–5.2)
PROT SERPL-MCNC: 6.6 G/DL (ref 6–8.5)
SODIUM SERPL-SCNC: 139 MMOL/L (ref 134–144)
TRIGL SERPL-MCNC: 108 MG/DL (ref 0–149)
VLDLC SERPL CALC-MCNC: 22 MG/DL (ref 5–40)

## 2019-01-22 RX ORDER — INDOMETHACIN 50 MG/1
CAPSULE ORAL
Qty: 90 CAP | Refills: 1 | Status: SHIPPED | OUTPATIENT
Start: 2019-01-22 | End: 2019-04-21 | Stop reason: SDUPTHER

## 2019-01-30 DIAGNOSIS — E66.01 MORBID OBESITY WITH BMI OF 40.0-44.9, ADULT (HCC): ICD-10-CM

## 2019-01-30 RX ORDER — PHENTERMINE HYDROCHLORIDE 37.5 MG/1
TABLET ORAL
Qty: 90 TAB | Refills: 1 | Status: SHIPPED | OUTPATIENT
Start: 2019-01-30 | End: 2019-03-22 | Stop reason: SDUPTHER

## 2019-01-30 RX ORDER — PHENTERMINE HYDROCHLORIDE 37.5 MG/1
TABLET ORAL
Qty: 90 TAB | Refills: 0
Start: 2019-01-30 | End: 2019-01-30 | Stop reason: SDUPTHER

## 2019-03-22 DIAGNOSIS — E66.01 MORBID OBESITY WITH BMI OF 40.0-44.9, ADULT (HCC): ICD-10-CM

## 2019-03-22 RX ORDER — PHENTERMINE HYDROCHLORIDE 37.5 MG/1
TABLET ORAL
Qty: 90 TAB | Refills: 1 | Status: SHIPPED | OUTPATIENT
Start: 2019-03-22 | End: 2019-05-02 | Stop reason: SDUPTHER

## 2019-04-22 RX ORDER — INDOMETHACIN 50 MG/1
CAPSULE ORAL
Qty: 90 CAP | Refills: 0 | Status: SHIPPED | OUTPATIENT
Start: 2019-04-22 | End: 2019-05-19 | Stop reason: SDUPTHER

## 2019-05-02 DIAGNOSIS — E66.01 MORBID OBESITY WITH BMI OF 40.0-44.9, ADULT (HCC): ICD-10-CM

## 2019-05-02 RX ORDER — PHENTERMINE HYDROCHLORIDE 37.5 MG/1
TABLET ORAL
Qty: 90 TAB | Refills: 1 | Status: SHIPPED | OUTPATIENT
Start: 2019-05-02 | End: 2020-01-10

## 2019-05-20 RX ORDER — INDOMETHACIN 50 MG/1
CAPSULE ORAL
Qty: 90 CAP | Refills: 0 | Status: SHIPPED | OUTPATIENT
Start: 2019-05-20 | End: 2019-06-18 | Stop reason: SDUPTHER

## 2019-05-31 DIAGNOSIS — I10 ESSENTIAL HYPERTENSION, BENIGN: ICD-10-CM

## 2019-05-31 RX ORDER — ATENOLOL 50 MG/1
TABLET ORAL
Qty: 90 TAB | Refills: 1 | Status: SHIPPED | OUTPATIENT
Start: 2019-05-31 | End: 2019-11-22 | Stop reason: SDUPTHER

## 2019-05-31 NOTE — TELEPHONE ENCOUNTER
Has not been seen since 2017. Seeing Dr. Manisha Pascual regularly and getting blood work.   Needs to come in and reestablish with us though, getting a lot of prescriptions

## 2019-06-18 RX ORDER — INDOMETHACIN 50 MG/1
CAPSULE ORAL
Qty: 90 CAP | Refills: 0 | Status: SHIPPED | OUTPATIENT
Start: 2019-06-18 | End: 2019-07-19 | Stop reason: SDUPTHER

## 2019-06-18 RX ORDER — LANSOPRAZOLE 30 MG/1
CAPSULE, DELAYED RELEASE ORAL
Qty: 180 CAP | Refills: 3 | Status: SHIPPED | OUTPATIENT
Start: 2019-06-18 | End: 2020-06-22

## 2019-06-18 RX ORDER — METFORMIN HYDROCHLORIDE 500 MG/1
TABLET, EXTENDED RELEASE ORAL
Qty: 180 TAB | Refills: 3 | Status: SHIPPED | OUTPATIENT
Start: 2019-06-18 | End: 2020-06-20

## 2019-06-26 RX ORDER — SYRING-NEEDL,DISP,INSUL,0.3 ML 31GX15/64"
SYRINGE, EMPTY DISPOSABLE MISCELLANEOUS
Qty: 200 PEN NEEDLE | Refills: 3 | Status: SHIPPED | OUTPATIENT
Start: 2019-06-26

## 2019-07-19 RX ORDER — INDOMETHACIN 50 MG/1
CAPSULE ORAL
Qty: 90 CAP | Refills: 0 | Status: SHIPPED | OUTPATIENT
Start: 2019-07-19 | End: 2019-11-26 | Stop reason: SDUPTHER

## 2019-07-29 ENCOUNTER — OFFICE VISIT (OUTPATIENT)
Dept: FAMILY MEDICINE CLINIC | Age: 49
End: 2019-07-29

## 2019-07-29 ENCOUNTER — OFFICE VISIT (OUTPATIENT)
Dept: ENDOCRINOLOGY | Age: 49
End: 2019-07-29

## 2019-07-29 VITALS
HEART RATE: 71 BPM | OXYGEN SATURATION: 95 % | RESPIRATION RATE: 14 BRPM | BODY MASS INDEX: 36.52 KG/M2 | TEMPERATURE: 98.8 F | DIASTOLIC BLOOD PRESSURE: 78 MMHG | HEIGHT: 72 IN | WEIGHT: 269.6 LBS | SYSTOLIC BLOOD PRESSURE: 124 MMHG

## 2019-07-29 VITALS
HEIGHT: 72 IN | HEART RATE: 61 BPM | WEIGHT: 266.2 LBS | SYSTOLIC BLOOD PRESSURE: 124 MMHG | BODY MASS INDEX: 36.06 KG/M2 | DIASTOLIC BLOOD PRESSURE: 84 MMHG

## 2019-07-29 DIAGNOSIS — E11.65 UNCONTROLLED TYPE 2 DIABETES MELLITUS WITH HYPERGLYCEMIA (HCC): Primary | ICD-10-CM

## 2019-07-29 DIAGNOSIS — R10.9 FLANK PAIN: Primary | ICD-10-CM

## 2019-07-29 DIAGNOSIS — E78.1 HIGH TRIGLYCERIDES: ICD-10-CM

## 2019-07-29 DIAGNOSIS — R63.4 WEIGHT LOSS: ICD-10-CM

## 2019-07-29 DIAGNOSIS — I10 ESSENTIAL HYPERTENSION, BENIGN: ICD-10-CM

## 2019-07-29 DIAGNOSIS — Z12.5 PROSTATE CANCER SCREENING: ICD-10-CM

## 2019-07-29 DIAGNOSIS — E66.01 MORBID OBESITY WITH BMI OF 40.0-44.9, ADULT (HCC): ICD-10-CM

## 2019-07-29 DIAGNOSIS — R10.12 LEFT UPPER QUADRANT PAIN: ICD-10-CM

## 2019-07-29 LAB
BILIRUB UR QL STRIP: NORMAL
GLUCOSE UR-MCNC: NEGATIVE MG/DL
HBA1C MFR BLD HPLC: 6.4 %
KETONES P FAST UR STRIP-MCNC: NORMAL MG/DL
PH UR STRIP: 6.5 [PH] (ref 4.6–8)
PROT UR QL STRIP: NORMAL
SP GR UR STRIP: 1.02 (ref 1–1.03)
UA UROBILINOGEN AMB POC: NORMAL (ref 0.2–1)
URINALYSIS CLARITY POC: CLEAR
URINALYSIS COLOR POC: NORMAL
URINE BLOOD POC: NEGATIVE
URINE LEUKOCYTES POC: NEGATIVE
URINE NITRITES POC: NEGATIVE

## 2019-07-29 NOTE — PROGRESS NOTES
HISTORY OF PRESENT ILLNESS  Maximus Murphy is a 53 y.o. male. HPI In to get established. Has hx of diabetes, sees Dr. Sarabjit Dalton for this problem. Also has hx of blood pressure, cholesterol. Also has hx of gout. Takes uloric to prevent gout and indocin prn. Hasnt had any problems since taking uloric. Has been having bilateral flank pains, usually come on after eating evening meals. Pains are fairly severe, has to lean forward, can last for 30-45 minutes. Started taking felipe fiber gummies, seemed to help. Has also lost 10 lbs without trying. No melena, diarrhea, constipation. Not taking any meds. No hematuria, no difficulty voiding. Had a renal cell cancer removed in 2013 (Dr. Angela Rodgers), and a tubulovillous adenoma removed 2014 (Dr. Floresita Arriaza)  ROS    Physical Exam   Constitutional: He appears well-developed and well-nourished. HENT:   Right Ear: External ear normal.   Left Ear: External ear normal.   Mouth/Throat: Oropharynx is clear and moist.   Neck: No thyromegaly present. Cardiovascular: Normal rate, regular rhythm, normal heart sounds and intact distal pulses. Pulmonary/Chest: Effort normal and breath sounds normal. No respiratory distress. He has no wheezes. Abdominal: Soft. Bowel sounds are normal. He exhibits no distension and no mass. There is no tenderness. There is no guarding. Question of a soft mass in RUQ. No tenderness   Musculoskeletal: Normal range of motion. He exhibits no edema. Lymphadenopathy:     He has no cervical adenopathy. Nursing note and vitals reviewed. ASSESSMENT and PLAN  Orders Placed This Encounter    CULTURE, URINE    CT ABD W CONT AND PELVIS W WO CONT    CBC WITH AUTOMATED DIFF    METABOLIC PANEL, COMPREHENSIVE    PROSTATE SPECIFIC AG    [facility]    AMB POC URINALYSIS DIP STICK AUTO W/ MICRO     Diagnoses and all orders for this visit:    1.  Flank pain  -     CULTURE, URINE  -     AMB POC URINALYSIS DIP STICK AUTO W/ MICRO  -     CBC WITH AUTOMATED DIFF  -     METABOLIC PANEL, COMPREHENSIVE    2. Prostate cancer screening  -     PSA, DIAGNOSTIC (PROSTATE SPECIFIC AG)    3. Left upper quadrant pain  -     CT ABD W CONT AND PELVIS W WO CONT; Future  -     REFERRAL TO GASTROENTEROLOGY    4. Weight loss  -     CT ABD W CONT AND PELVIS W WO CONT; Future  -     REFERRAL TO GASTROENTEROLOGY      Follow-up and Dispositions    · Return in about 3 weeks (around 8/19/2019).

## 2019-07-29 NOTE — PROGRESS NOTES
Chief Complaint   Patient presents with    Diabetes     pcp and pharmacy confirmed    Diabetic Foot Exam     due    Other     eye exam is due     History of Present Illness: Tia Canela is a 53 y.o. male here for follow up of diabetes. Weight down 9 lbs since last visit in 1/19. Finished his job in [employer] and then worked with another company for a brief time and then transitioned to a job in [place] that worked nights and did this until about 3 weeks ago. Hasn't had as much appetite as before but no n/v. Will be seeing Dr. Pepe Crawford for a new patient visit. States he never ended up taking 2 shots of insulin per day but just hasn't been eating as much. Still taking phentermine 1 tab daily. Was missing pills at times when he was laid off but has been more consistent recently and also was taking his one dose of 100 units closer to 2pm when he was working night shifts but now is taking this back in the morning while off work. Hasn't checked his sugar at all. Current Outpatient Medications   Medication Sig    indomethacin (INDOCIN) 50 mg capsule TAKE ONE CAPSULE BY MOUTH 3 TIMES A DAY AS NEEDED    BD VEO INSULIN SYRINGE UF 1 mL 31 gauge x 15/64\" syrg USE AS DIRECTED TWICE DAILY    lansoprazole (PREVACID) 30 mg capsule TAKE ONE CAPSULE TWICE A DAY BEFORE BREAKFAST AND SUPPER FOR REFLUX    metFORMIN ER (GLUCOPHAGE XR) 500 mg tablet TAKE 1 TABLET BY MOUTH TWICE A DAY    atenolol (TENORMIN) 50 mg tablet TAKE 1 TAB BY MOUTH DAILY.  phentermine (ADIPEX-P) 37.5 mg tablet TAKE ONE TABLET BY MOUTH EVERY MORNING    insulin NPH/insulin regular (NOVOLIN 70/30 U-100 INSULIN) 100 unit/mL (70-30) injection INJECT 100 UNITS IN THE MORNING AND 50 UNITS AT DINNER OR AS DIRECTED UP  UNITS A DAY    ULORIC 40 mg tab tablet TAKE 1 TABLET BY MOUTH DAILY    ONETOUCH VERIO strip USE TO TEST BLOOD SUGAR TWICE DAILY AS DIRECTED     No current facility-administered medications for this visit.       Allergies Allergen Reactions    Penicillin G Anaphylaxis     \"almost killed me as a child according to my grandma (a nurse)\"     Review of Systems:  - Eyes: no blurry vision or double vision  - Cardiovascular: no chest pain  - Respiratory: no shortness of breath  - Musculoskeletal: no myalgias  - Neurological: no numbness/tingling in extremities    Physical Examination:  Blood pressure 124/84, pulse 61, height 6' (1.829 m), weight 266 lb 3.2 oz (120.7 kg). - General: pleasant, no distress, good eye contact   - Neck: no carotid bruits  - Cardiovascular: regular, normal rate, nl s1 and s2, no m/r/g,   - Respiratory: clear bilaterally  - Integumentary: no edema,   - Psychiatric: normal mood and affect    Diabetic foot exam:     Left Foot:   Visual Exam: callous - mild   Pulse DP: 2+ (normal)   Filament test: normal sensation    Vibratory sensation: normal      Right Foot:   Visual Exam: callous - mild   Pulse DP: 2+ (normal)   Filament test: normal sensation    Vibratory sensation: normal        Data Reviewed:   Component      Latest Ref Rng & Units 7/29/2019           9:43 AM   Hemoglobin A1c (POC)      % 6.4       Assessment/Plan:     1. DM w/o complication type II, uncontrolled: his most recent Hgb A1c was 6.4% in 7/19 down from 9.8% in 1/19 up from 8.7% in 7/18 up from 8.5% in 1/18 down from 9.6% in 7/17 down from 10.8% in 2/17 down from 12.2% in 9/16 up from 11.9% in 8/16 up from 9.7% in 4/16 down from 9.9% in 4/15 up from 9.3% in 12/14 down from 10.6% in 8/14 up from 8.4% in 4/14 down from 11.2% in 10/13 up from 7.3% in 3/13 down from 7.7% in 1/13 down from 10.2% in August 2012.   A1c is the best it's been in 7 years so praised him for this  - cont novolin 70/30 insulin 100 units in am but take 50 units at dinner   - cont metformin  mg 2 tabs daily  - check bs 2x daily  - optho UTD 6/12--due now  - foot exam done 7/19  - microalbumin nl 10/12, up to 75 in 4/15, down to 13 in 4/16, up to 50.3 in 7/17, down to 13 in 1/18 and 12.9 in 1/19  - check cmp and microalbumin at next visit  - check POC A1c at next visit        2. Hypertension: his BP was at goal < 140/90  - cont atenolol 50 mg daily      3. High triglycerides: Given DM, Goal LDL < 100, non-HDL < 130, and TG < 150.  and non- in 1/13,  and non- in 10/13,  and non- in 4/14, non- in 12/14.  and LDL 81 in 12/14.  in 4/15. LDL 75 and  in 4/16. LDL 56 and  in 9/16. LDL 53 and  with weight loss. LDL 66 and  in 7/17. LDL 75 and  in 7/18. LDL 74 and  in 1/19  - diet control for now  - check lipids at next visit      4. Obesity: Had lost 36 lbs so far since June 2013 on phentermine but weight up 10 lbs since 8/14. I restarted in 9/16 and wt down 23 lbs by 2/17 but up 5 lbs in 7/17 but had been out for 1 month. Restarted and had a \"rush\" sensation so he stopped this but wt down 8 lbs by 1/18. Restarted 1/2 tab daily and worked back up to 1 tab daily and wt down 8 lbs by 7/18. Up 3 lbs by 1/19. Down 9 lbs by 7/19.  - cont phentermine 37.5 mg 1 tab daily    Patient Instructions   1) Your Hemoglobin A1c (3 month test of blood sugar) was 6.4% which is the best it's been since 2012. 2) Your blood pressure is at goal.    3) I'll defer any other lab draw to Dr. Adan Finney today. Follow-up and Dispositions    · Return in about 6 months (around 1/29/2020).                Copy sent to:  Brook Griffin MD

## 2019-07-29 NOTE — PATIENT INSTRUCTIONS
1) Your Hemoglobin A1c (3 month test of blood sugar) was 6.4% which is the best it's been since 2012. 2) Your blood pressure is at goal.    3) I'll defer any other lab draw to Dr. Justin Anand today.

## 2019-07-29 NOTE — PROGRESS NOTES
Chief Complaint   Patient presents with    Flank Pain     bilateral      1. Have you been to the ER, urgent care clinic since your last visit? Hospitalized since your last visit? No    2. Have you seen or consulted any other health care providers outside of the [address] [place] [address] since your last visit? Include any pap smears or colon screening. No     Health Maintenance Due   Topic Date Due    Pneumococcal 0-64 years (1 of 1 - PPSV23) 11/09/1977    EYE EXAM RETINAL OR DILATED  11/09/1981    DTaP/Tdap/Td series (1 - Tdap) 11/09/1992    HEMOGLOBIN A1C Q6M  07/11/2019    FOOT EXAM Q1  07/20/2019     Pt has eye doctor appt Aug 13th Dr. Bladimir Ely in [facility] .  informed pt to get them to fax us at [facility].

## 2019-07-30 LAB
ALBUMIN SERPL-MCNC: 4.8 G/DL (ref 3.5–5.5)
ALBUMIN/GLOB SERPL: 1.8 {RATIO} (ref 1.2–2.2)
ALP SERPL-CCNC: 64 IU/L (ref 39–117)
ALT SERPL-CCNC: 40 IU/L (ref 0–44)
AST SERPL-CCNC: 28 IU/L (ref 0–40)
BASOPHILS # BLD AUTO: 0.1 X10E3/UL (ref 0–0.2)
BASOPHILS NFR BLD AUTO: 1 %
BILIRUB SERPL-MCNC: 0.4 MG/DL (ref 0–1.2)
BUN SERPL-MCNC: 13 MG/DL (ref 6–24)
BUN/CREAT SERPL: 13 (ref 9–20)
CALCIUM SERPL-MCNC: 9.7 MG/DL (ref 8.7–10.2)
CHLORIDE SERPL-SCNC: 97 MMOL/L (ref 96–106)
CO2 SERPL-SCNC: 25 MMOL/L (ref 20–29)
CREAT SERPL-MCNC: 0.98 MG/DL (ref 0.76–1.27)
EOSINOPHIL # BLD AUTO: 0.4 X10E3/UL (ref 0–0.4)
EOSINOPHIL NFR BLD AUTO: 4 %
ERYTHROCYTE [DISTWIDTH] IN BLOOD BY AUTOMATED COUNT: 13.4 % (ref 12.3–15.4)
GLOBULIN SER CALC-MCNC: 2.6 G/DL (ref 1.5–4.5)
GLUCOSE SERPL-MCNC: 99 MG/DL (ref 65–99)
HCT VFR BLD AUTO: 48.9 % (ref 37.5–51)
HGB BLD-MCNC: 16.9 G/DL (ref 13–17.7)
IMM GRANULOCYTES # BLD AUTO: 0 X10E3/UL (ref 0–0.1)
IMM GRANULOCYTES NFR BLD AUTO: 0 %
LYMPHOCYTES # BLD AUTO: 3.6 X10E3/UL (ref 0.7–3.1)
LYMPHOCYTES NFR BLD AUTO: 34 %
MCH RBC QN AUTO: 31.7 PG (ref 26.6–33)
MCHC RBC AUTO-ENTMCNC: 34.6 G/DL (ref 31.5–35.7)
MCV RBC AUTO: 92 FL (ref 79–97)
MONOCYTES # BLD AUTO: 1.2 X10E3/UL (ref 0.1–0.9)
MONOCYTES NFR BLD AUTO: 12 %
NEUTROPHILS # BLD AUTO: 5.4 X10E3/UL (ref 1.4–7)
NEUTROPHILS NFR BLD AUTO: 49 %
PLATELET # BLD AUTO: 275 X10E3/UL (ref 150–450)
POTASSIUM SERPL-SCNC: 4.7 MMOL/L (ref 3.5–5.2)
PROT SERPL-MCNC: 7.4 G/DL (ref 6–8.5)
PSA SERPL-MCNC: 0.3 NG/ML (ref 0–4)
RBC # BLD AUTO: 5.33 X10E6/UL (ref 4.14–5.8)
SODIUM SERPL-SCNC: 138 MMOL/L (ref 134–144)
WBC # BLD AUTO: 10.6 X10E3/UL (ref 3.4–10.8)

## 2019-07-31 LAB — BACTERIA UR CULT: NORMAL

## 2019-08-06 NOTE — MR AVS SNAPSHOT
Visit Information Date & Time Provider Department Dept. Phone Encounter #  
 2/17/2017 10:30 AM Adwoa Marino, [address] [place] [address] Diabetes and Endocrinology [phone] Follow-up Instructions Return in about 5 months (around 7/17/2017). Upcoming Health Maintenance Date Due Pneumococcal 19-64 Highest Risk (1 of 3 - PCV13) 11/9/1990 DTaP/Tdap/Td series (1 - Tdap) 11/9/1992 EYE EXAM RETINAL OR DILATED Q1 7/10/2013 INFLUENZA AGE 9 TO ADULT 8/1/2016 HEMOGLOBIN A1C Q6M 3/16/2017 FOOT EXAM Q1 4/15/2017 MICROALBUMIN Q1 4/15/2017 LIPID PANEL Q1 9/16/2017 Allergies as of 2/17/2017  Review Complete On: 2/17/2017 By: Adwoa Marino MD  
  
 Severity Noted Reaction Type Reactions Penicillin G High 09/05/2009   Systemic Anaphylaxis \"almost killed me as a child according to my grandma (a nurse)\" Current Immunizations  Reviewed on 12/8/2014 Name Date Influenza Vaccine 11/5/2013 Influenza Vaccine Alcario Ravens) 12/8/2014 Influenza Vaccine Split 9/24/2012 Not reviewed this visit You Were Diagnosed With   
  
 Codes Comments Uncontrolled type 2 diabetes mellitus with complication, with long-term current use of insulin (HCC)    -  Primary ICD-10-CM: E11.8, E11.65, Z79.4 ICD-9-CM: 250.82, V58.67 Morbid obesity with BMI of 40.0-44.9, adult (HCC)     ICD-10-CM: E66.01, Z68.41 
ICD-9-CM: 278.01, V85.41 Essential hypertension, benign     ICD-10-CM: I10 
ICD-9-CM: 401.1 High triglycerides     ICD-10-CM: E78.1 ICD-9-CM: 272.1 Vitals BP Pulse Height(growth percentile) Weight(growth percentile) BMI Smoking Status 128/88 (BP 1 Location: Left arm, BP Patient Position: Sitting) 90 6' (1.829 m) 283 lb 3.2 oz (128.5 kg) 38.41 kg/m2 Current Every Day Smoker Vitals History BMI and BSA Data Body Mass Index Body Surface Area  
 38.41 kg/m 2 2.55 m 2 Preferred Pharmacy Pharmacy Name Phone [id number] [facility] [phone] Your Updated Medication List  
  
   
This list is accurate as of: 2/17/17 11:09 AM.  Always use your most recent med list.  
  
  
  
  
 atenolol 50 mg tablet Commonly known as:  TENORMIN  
TAKE 1 TABLET BY MOUTH DAILY  
  
 febuxostat 40 mg Tab tablet Commonly known as:  ULORIC  
TAKE 1 TAB BY MOUTH DAILY. indomethacin 50 mg capsule Commonly known as:  INDOCIN  
TAKE 1 CAPSULE BY MOUTH 3 TIMES DAILY WITH FOOD FOR JOINT PAIN AND INFLAMMATION Insulin Syringe-Needle U-100 1 mL 31 gauge x 5/16 Syrg Commonly known as:  BD INSULIN SYRINGE ULTRA-FINE  
USE AS DIRECTED TWICE DAILY  
  
 lansoprazole 30 mg capsule Commonly known as:  PREVACID TAKE ONE CAPSULE TWICE A DAY BEFORE BREAKFAST AND SUPPER FOR REFLUX  
  
 metFORMIN  mg tablet Commonly known as:  GLUCOPHAGE XR  
TAKE 1 TABLET TWICE A DAY NovoLIN 70/30 100 unit/mL (70-30) injection Generic drug:  insulin NPH/insulin regular INJECT 75 UNITS TWO TIMES  A DAY OR AS DIRECTED UP  UNITS DAILY  
  
 ONETOUCH VERIO strip Generic drug:  glucose blood VI test strips USE TO TEST BLOOD SUGAR TWICE DAILY AS DIRECTED  
  
 phentermine 37.5 mg tablet Commonly known as:  ADIPEX-P Take 1 Tab by mouth every morning. Max Daily Amount: 37.5 mg. We Performed the Following AMB POC HEMOGLOBIN A1C [92395 CPT(R)] LIPID PANEL [64281 CPT(R)] METABOLIC PANEL, COMPREHENSIVE [19686 CPT(R)] DE COLLECTION VENOUS BLOOD,VENIPUNCTURE K296895 CPT(R)] DE HANDLG&/OR CONVEY OF SPEC FOR TR OFFICE TO LAB [39112 CPT(R)] Follow-up Instructions Return in about 5 months (around 7/17/2017). Patient Instructions 1) Your Hemoglobin A1c is a 3 month marker of your diabetes control. Goal is less than 7% which means your average blood sugar is less than 150. Your Hemoglobin A1c is 10.8% which means your diabetes is under better control than 12.2% at your last check. Continue to work on your diet and exercise and take all your medications (metformin and insulin) as directed. 2) Your have lost 23 lbs since last visit. Keep up the good work. 3) Do your best to get both shots of 100 units everyday to keep your A1c down. 4) I will send you a message through [employer] with your lab results. 5) Your blood pressure was at goal. 
 
 
 
 
  
Introducing [facility]! Dear Bladimir Longo: Thank you for requesting a [employer] account. Our records indicate that you already have an active [employer] account. You can access your account anytime at [email or web address] Did you know that you can access your hospital and ER discharge instructions at any time in [employer]? You can also review all of your test results from your hospital stay or ER visit. Additional Information If you have questions, please visit the Frequently Asked Questions section of the [employer] website at [email or web address] Remember, [employer] is NOT to be used for urgent needs. For medical emergencies, dial 911. Now available from your iPhone and Android! Please provide this summary of care documentation to your next provider. Your primary care clinician is listed as Marisela Matt. If you have any questions after today's visit, please call [phone]. 06-Aug-2019

## 2019-08-07 ENCOUNTER — HOSPITAL ENCOUNTER (OUTPATIENT)
Dept: CT IMAGING | Age: 49
Discharge: HOME | End: 2019-08-07
Attending: FAMILY MEDICINE
Payer: COMMERCIAL

## 2019-08-07 DIAGNOSIS — R63.4 WEIGHT LOSS: ICD-10-CM

## 2019-08-07 DIAGNOSIS — R10.12 LEFT UPPER QUADRANT PAIN: ICD-10-CM

## 2019-08-07 PROCEDURE — 74011636320 HC RX REV CODE- 636/320: Performed by: FAMILY MEDICINE

## 2019-08-07 PROCEDURE — 74177 CT ABD & PELVIS W/CONTRAST: CPT

## 2019-08-07 RX ORDER — SODIUM CHLORIDE 0.9 % (FLUSH) 0.9 %
10 SYRINGE (ML) INJECTION
Status: COMPLETED | OUTPATIENT
Start: 2019-08-07 | End: 2019-08-07

## 2019-08-07 RX ADMIN — Medication 10 ML: at 14:58

## 2019-08-07 RX ADMIN — IOHEXOL 50 ML: 240 INJECTION, SOLUTION INTRATHECAL; INTRAVASCULAR; INTRAVENOUS; ORAL at 14:58

## 2019-08-07 RX ADMIN — IOPAMIDOL 100 ML: 755 INJECTION, SOLUTION INTRAVENOUS at 14:57

## 2019-09-03 ENCOUNTER — ANESTHESIA EVENT (OUTPATIENT)
Dept: ENDOSCOPY | Age: 49
End: 2019-09-03
Payer: COMMERCIAL

## 2019-09-03 ENCOUNTER — HOSPITAL ENCOUNTER (OUTPATIENT)
Age: 49
Setting detail: OUTPATIENT SURGERY
Discharge: HOME | End: 2019-09-03
Attending: INTERNAL MEDICINE | Admitting: INTERNAL MEDICINE
Payer: COMMERCIAL

## 2019-09-03 ENCOUNTER — ANESTHESIA (OUTPATIENT)
Dept: ENDOSCOPY | Age: 49
End: 2019-09-03
Payer: COMMERCIAL

## 2019-09-03 VITALS
RESPIRATION RATE: 20 BRPM | DIASTOLIC BLOOD PRESSURE: 79 MMHG | BODY MASS INDEX: 36.03 KG/M2 | WEIGHT: 266 LBS | OXYGEN SATURATION: 97 % | SYSTOLIC BLOOD PRESSURE: 138 MMHG | TEMPERATURE: 98 F | HEIGHT: 72 IN | HEART RATE: 86 BPM

## 2019-09-03 LAB
GLUCOSE BLD STRIP.AUTO-MCNC: 164 MG/DL (ref 65–100)
SERVICE CMNT-IMP: ABNORMAL

## 2019-09-03 PROCEDURE — 74011250636 HC RX REV CODE- 250/636: Performed by: NURSE ANESTHETIST, CERTIFIED REGISTERED

## 2019-09-03 PROCEDURE — 76040000019: Performed by: INTERNAL MEDICINE

## 2019-09-03 PROCEDURE — 88305 TISSUE EXAM BY PATHOLOGIST: CPT

## 2019-09-03 PROCEDURE — 76060000031 HC ANESTHESIA FIRST 0.5 HR: Performed by: INTERNAL MEDICINE

## 2019-09-03 PROCEDURE — 77030013992 HC SNR POLYP ENDOSC BSC -B: Performed by: INTERNAL MEDICINE

## 2019-09-03 PROCEDURE — 74011250636 HC RX REV CODE- 250/636: Performed by: INTERNAL MEDICINE

## 2019-09-03 PROCEDURE — 82962 GLUCOSE BLOOD TEST: CPT

## 2019-09-03 RX ORDER — EPINEPHRINE 0.1 MG/ML
1 INJECTION INTRACARDIAC; INTRAVENOUS
Status: DISCONTINUED | OUTPATIENT
Start: 2019-09-03 | End: 2019-09-03 | Stop reason: HOSPADM

## 2019-09-03 RX ORDER — MIDAZOLAM HYDROCHLORIDE 1 MG/ML
.25-5 INJECTION, SOLUTION INTRAMUSCULAR; INTRAVENOUS
Status: DISCONTINUED | OUTPATIENT
Start: 2019-09-03 | End: 2019-09-03 | Stop reason: HOSPADM

## 2019-09-03 RX ORDER — FENTANYL CITRATE 50 UG/ML
50 INJECTION, SOLUTION INTRAMUSCULAR; INTRAVENOUS
Status: DISCONTINUED | OUTPATIENT
Start: 2019-09-03 | End: 2019-09-03 | Stop reason: SDUPTHER

## 2019-09-03 RX ORDER — SODIUM CHLORIDE 0.9 % (FLUSH) 0.9 %
5-40 SYRINGE (ML) INJECTION EVERY 8 HOURS
Status: DISCONTINUED | OUTPATIENT
Start: 2019-09-03 | End: 2019-09-03 | Stop reason: HOSPADM

## 2019-09-03 RX ORDER — DEXTROMETHORPHAN/PSEUDOEPHED 2.5-7.5/.8
1.2 DROPS ORAL
Status: DISCONTINUED | OUTPATIENT
Start: 2019-09-03 | End: 2019-09-03 | Stop reason: HOSPADM

## 2019-09-03 RX ORDER — PROPOFOL 10 MG/ML
INJECTION, EMULSION INTRAVENOUS AS NEEDED
Status: DISCONTINUED | OUTPATIENT
Start: 2019-09-03 | End: 2019-09-03 | Stop reason: HOSPADM

## 2019-09-03 RX ORDER — NALOXONE HYDROCHLORIDE 0.4 MG/ML
0.4 INJECTION, SOLUTION INTRAMUSCULAR; INTRAVENOUS; SUBCUTANEOUS
Status: DISCONTINUED | OUTPATIENT
Start: 2019-09-03 | End: 2019-09-03 | Stop reason: SDUPTHER

## 2019-09-03 RX ORDER — ATROPINE SULFATE 0.1 MG/ML
0.5 INJECTION INTRAVENOUS
Status: DISCONTINUED | OUTPATIENT
Start: 2019-09-03 | End: 2019-09-03 | Stop reason: HOSPADM

## 2019-09-03 RX ORDER — SODIUM CHLORIDE 0.9 % (FLUSH) 0.9 %
5-40 SYRINGE (ML) INJECTION EVERY 8 HOURS
Status: DISCONTINUED | OUTPATIENT
Start: 2019-09-03 | End: 2019-09-03 | Stop reason: SDUPTHER

## 2019-09-03 RX ORDER — FLUMAZENIL 0.1 MG/ML
0.2 INJECTION INTRAVENOUS
Status: DISCONTINUED | OUTPATIENT
Start: 2019-09-03 | End: 2019-09-03 | Stop reason: HOSPADM

## 2019-09-03 RX ORDER — LIDOCAINE HYDROCHLORIDE 20 MG/ML
INJECTION, SOLUTION EPIDURAL; INFILTRATION; INTRACAUDAL; PERINEURAL AS NEEDED
Status: DISCONTINUED | OUTPATIENT
Start: 2019-09-03 | End: 2019-09-03 | Stop reason: HOSPADM

## 2019-09-03 RX ORDER — SODIUM CHLORIDE 9 MG/ML
75 INJECTION, SOLUTION INTRAVENOUS CONTINUOUS
Status: DISCONTINUED | OUTPATIENT
Start: 2019-09-03 | End: 2019-09-03 | Stop reason: HOSPADM

## 2019-09-03 RX ORDER — NALOXONE HYDROCHLORIDE 0.4 MG/ML
0.4 INJECTION, SOLUTION INTRAMUSCULAR; INTRAVENOUS; SUBCUTANEOUS
Status: DISCONTINUED | OUTPATIENT
Start: 2019-09-03 | End: 2019-09-03 | Stop reason: HOSPADM

## 2019-09-03 RX ORDER — MIDAZOLAM HYDROCHLORIDE 1 MG/ML
.25-5 INJECTION, SOLUTION INTRAMUSCULAR; INTRAVENOUS
Status: DISCONTINUED | OUTPATIENT
Start: 2019-09-03 | End: 2019-09-03 | Stop reason: SDUPTHER

## 2019-09-03 RX ORDER — FLUMAZENIL 0.1 MG/ML
0.2 INJECTION INTRAVENOUS
Status: DISCONTINUED | OUTPATIENT
Start: 2019-09-03 | End: 2019-09-03 | Stop reason: SDUPTHER

## 2019-09-03 RX ORDER — FENTANYL CITRATE 50 UG/ML
25 INJECTION, SOLUTION INTRAMUSCULAR; INTRAVENOUS
Status: DISCONTINUED | OUTPATIENT
Start: 2019-09-03 | End: 2019-09-03 | Stop reason: HOSPADM

## 2019-09-03 RX ORDER — SODIUM CHLORIDE 0.9 % (FLUSH) 0.9 %
5-40 SYRINGE (ML) INJECTION AS NEEDED
Status: DISCONTINUED | OUTPATIENT
Start: 2019-09-03 | End: 2019-09-03 | Stop reason: SDUPTHER

## 2019-09-03 RX ORDER — SODIUM CHLORIDE 0.9 % (FLUSH) 0.9 %
5-40 SYRINGE (ML) INJECTION AS NEEDED
Status: DISCONTINUED | OUTPATIENT
Start: 2019-09-03 | End: 2019-09-03 | Stop reason: HOSPADM

## 2019-09-03 RX ADMIN — LIDOCAINE HYDROCHLORIDE 100 MG: 20 INJECTION, SOLUTION EPIDURAL; INFILTRATION; INTRACAUDAL; PERINEURAL at 11:54

## 2019-09-03 RX ADMIN — SODIUM CHLORIDE 75 ML/HR: 900 INJECTION, SOLUTION INTRAVENOUS at 11:49

## 2019-09-03 RX ADMIN — PROPOFOL 100 MG: 10 INJECTION, EMULSION INTRAVENOUS at 11:54

## 2019-09-03 RX ADMIN — PROPOFOL 40 MG: 10 INJECTION, EMULSION INTRAVENOUS at 11:59

## 2019-09-03 RX ADMIN — PROPOFOL 50 MG: 10 INJECTION, EMULSION INTRAVENOUS at 12:04

## 2019-09-03 NOTE — DISCHARGE INSTRUCTIONS
Kayla Gunderson  [id number]  1970    COLON DISCHARGE INSTRUCTIONS  Discomfort:  Redness at IV site- apply warm compress to area; if redness or soreness persist- contact your physician  There may be a slight amount of blood passed from the rectum  Gaseous discomfort- walking, belching will help relieve any discomfort  You may not operate a vehicle for 12 hours  You may not engage in an occupation involving machinery or appliances for rest of today  You may not drink alcoholic beverages for at least 12 hours  Avoid making any critical decisions for at least 24 hour  DIET:   High fiber diet. - however -  remember your colon is empty and a heavy meal will produce gas. Avoid these foods:  vegetables, fried / greasy foods, carbonated drinks for today  MEDICATION:         ACTIVITY:  You may not resume your normal daily activities until tomorrow AM; it is recommended that you spend the remainder of the day resting -  avoid any strenuous activity. CALL M.D.   ANY SIGN OF:   Increasing pain, nausea, vomiting  Abdominal distension (swelling)  New increased bleeding (oral or rectal)  Fever (chills)  Pain in chest area  Bloody discharge from nose or mouth  Shortness of breath    IMPRESSION:  -Normal ileum  -Two diminutive 2-3mm benign-appearing rectal polyps at 6 and 10cm, removed with cold snare  -Small Grade 1 internal hemorrhoids    Follow-up Instructions:   Call Dr. Roxie Church for the results of procedure / biopsy in 7-10 days  Telephone # [phone]  Repeat colonoscopy in 5 years    Nathaniel Moreno MD

## 2019-09-03 NOTE — ANESTHESIA POSTPROCEDURE EVALUATION
Procedure(s):  COLONOSCOPY  ENDOSCOPIC POLYPECTOMY. general, total IV anesthesia    Anesthesia Post Evaluation        Patient location during evaluation: PACU  Note status: Adequate. Level of consciousness: responsive to verbal stimuli and sleepy but conscious  Pain management: satisfactory to patient  Airway patency: patent  Anesthetic complications: no  Cardiovascular status: acceptable  Respiratory status: acceptable  Hydration status: acceptable  Comments: +Post-Anesthesia Evaluation and Assessment    Patient: Liang Bello MRN: [id number]  SSN: [id number]   YOB: 1970  Age: 53 y.o. Sex: male      Cardiovascular Function/Vital Signs    /75   Pulse 73   Temp 36.8 °C (98.3 °F)   Resp 16   Ht 6' (1.829 m)   Wt 120.7 kg (266 lb)   SpO2 97%   BMI 36.08 kg/m²     Patient is status post Procedure(s):  COLONOSCOPY  ENDOSCOPIC POLYPECTOMY. Nausea/Vomiting: Controlled. Postoperative hydration reviewed and adequate. Pain:  Pain Scale 1: Visual (09/03/19 1215)  Pain Intensity 1: 0 (09/03/19 1215)   Managed. Neurological Status: At baseline. Mental Status and Level of Consciousness: Arousable. Pulmonary Status:   O2 Device: Room air (09/03/19 1215)   Adequate oxygenation and airway patent. Complications related to anesthesia: None    Post-anesthesia assessment completed. No concerns. Signed By: Neal Levine MD    9/3/2019  Post anesthesia nausea and vomiting:  controlled      Vitals Value Taken Time   /89 9/3/2019 12:21 PM   Temp     Pulse 78 9/3/2019 12:22 PM   Resp 18 9/3/2019 12:22 PM   SpO2 96 % 9/3/2019 12:22 PM   Vitals shown include unvalidated device data.

## 2019-09-03 NOTE — PROCEDURES
NAME:  Shad Plummer   :   1970   MRN:   [id number]     Date/Time:  9/3/2019 12:14 PM    Colonoscopy Operative Report    Procedure Type:   Colonoscopy with polypectomy (cold snare)     Indications:     Personal history of colon polyps (screening only)  Pre-operative Diagnosis: see indication above  Post-operative Diagnosis:  See findings below  :  Allegra Meza MD  Referring Provider: Avtar Wang MD    Exam:  Airway: clear, no airway problems anticipated  Heart: RRR, without gallops or rubs  Lungs: clear bilaterally without wheezes, crackles, or rhonchi  Abdomen: soft, nontender, nondistended, bowel sounds present  Mental Status: awake, alert and oriented to person, place and time    Sedation:  MAC anesthesia (Propofol 120mg IV, Versed 1mg IV)  Procedure Details:  After informed consent was obtained with all risks and benefits of procedure explained and preoperative exam completed, the patient was taken to the endoscopy suite and placed in the left lateral decubitus position. Upon sequential sedation as per above, a digital rectal exam was performed demonstrating internal hemorrhoids. The Olympus videocolonoscope  was inserted in the rectum and carefully advanced to the cecum, which was identified by the ileocecal valve and appendiceal orifice. The distal 5cm of the terminal ileum was evaluated. The quality of preparation was excellent. The colonoscope was slowly withdrawn with careful evaluation between folds. Retroflexion in the rectum was completed demonstrating internal hemorrhoids.      Findings:     -Normal ileum  -Two diminutive 2-3mm benign-appearing rectal polyps at 6 and 10cm, removed with cold snare  -Small Grade 1 internal hemorrhoids     Specimen Removed:  #1 rectal polyp (2)  Complications: None.    EBL:  None.     Impression:    -Normal ileum  -Two diminutive 2-3mm benign-appearing rectal polyps at 6 and 10cm, removed with cold snare  -Small Grade 1 internal hemorrhoids     Recommendations: --Await pathology. , -Repeat colonoscopy in 5 years. , -Follow up with primary care physician. High fiber diet. Resume normal medication(s). You will receive a letter about the biopsy results in about 10 days.   You may be asked to call your doctor's office for the results.        Discharge Disposition:  Home in the company of a  when able to ambulate.     Konstantin Driver MD

## 2019-09-03 NOTE — ROUTINE PROCESS
Anesthesia reports 190mg Propofol, 100mg Lidocaine and 300mL NS given during procedure. Received report from anesthesia staff on vital signs and status of patient.

## 2019-09-03 NOTE — ANESTHESIA PREPROCEDURE EVALUATION
Anesthetic History   No history of anesthetic complications            Review of Systems / Medical History  Patient summary reviewed, nursing notes reviewed and pertinent labs reviewed    Pulmonary        Sleep apnea (does not use his CPAP): CPAP  Smoker      Comments: Smoker - 1 ppd x 35 years   Neuro/Psych         Psychiatric history (Anxiety)     Cardiovascular    Hypertension: poorly controlled              Exercise tolerance: >4 METS  Comments: Denies CP  Takes medication to help loose wt which increases his heartrate     GI/Hepatic/Renal     GERD: well controlled          Comments: Colon Polyps Endo/Other    Diabetes: using insulin    Morbid obesity, arthritis and cancer (Hx of Renal CA)     Other Findings   Comments: Hard of hearing--Bilaterally           Physical Exam    Airway  Mallampati: III  TM Distance: 4 - 6 cm  Neck ROM: short neck   Mouth opening: Normal     Cardiovascular    Rhythm: regular  Rate: abnormal        Comments: Tachycardia from wt loss medication Dental    Dentition: Upper partial plate  Comments: Top right partial with one tooth attached   Pulmonary  Breath sounds clear to auscultation               Abdominal  GI exam deferred       Other Findings            Anesthetic Plan    ASA: 3  Anesthesia type: general and total IV anesthesia          Induction: Intravenous  Anesthetic plan and risks discussed with: Patient      Propofol MAC

## 2019-09-11 ENCOUNTER — OFFICE VISIT (OUTPATIENT)
Dept: FAMILY MEDICINE CLINIC | Age: 49
End: 2019-09-11

## 2019-09-11 VITALS
TEMPERATURE: 99.1 F | OXYGEN SATURATION: 98 % | SYSTOLIC BLOOD PRESSURE: 129 MMHG | HEART RATE: 69 BPM | WEIGHT: 266 LBS | DIASTOLIC BLOOD PRESSURE: 90 MMHG | BODY MASS INDEX: 36.03 KG/M2 | HEIGHT: 72 IN | RESPIRATION RATE: 16 BRPM

## 2019-09-11 DIAGNOSIS — R10.9 FLANK PAIN: Primary | ICD-10-CM

## 2019-09-11 RX ORDER — SODIUM, POTASSIUM,MAG SULFATES 17.5-3.13G
SOLUTION, RECONSTITUTED, ORAL ORAL
Refills: 0 | COMMUNITY
Start: 2019-08-20 | End: 2019-09-11 | Stop reason: ALTCHOICE

## 2019-09-11 NOTE — PROGRESS NOTES
HISTORY OF PRESENT ILLNESS  Liang Bello is a 53 y.o. male. HPI still having some pains in L flank, occasionally on the right. Pains can last up to 20-25 minutes. Seemed to be helped by lying down. Has had a normal colonoscopy and ct scan of abdomen. If misses his fiber gummies, pain seems to be worse. NO weight loss, early satiety, melena, vomiting. ROS    Physical Exam   Constitutional: He appears well-developed and well-nourished. HENT:   Right Ear: External ear normal.   Left Ear: External ear normal.   Mouth/Throat: Oropharynx is clear and moist.   Neck: No thyromegaly present. Cardiovascular: Normal rate, regular rhythm, normal heart sounds and intact distal pulses. Pulmonary/Chest: Effort normal and breath sounds normal. No respiratory distress. He has no wheezes. Abdominal: Soft. Bowel sounds are normal. He exhibits no distension and no mass. There is no tenderness. There is no guarding. Musculoskeletal: Normal range of motion. He exhibits no edema. Lymphadenopathy:     He has no cervical adenopathy. Nursing note and vitals reviewed. ASSESSMENT and PLAN  No orders of the defined types were placed in this encounter. Follow-up and Dispositions    · Return in about 4 months (around 1/11/2020).

## 2019-09-11 NOTE — PROGRESS NOTES
Chief Complaint   Patient presents with    Follow-up     1. Have you been to the ER, urgent care clinic since your last visit? Hospitalized since your last visit? No    2. Have you seen or consulted any other health care providers outside of the [address] [place] [address] since your last visit? Include any pap smears or colon screening. YES recent colonoscopy and CT scan. Patient having low back pain from over exertion of yard work three days ago.

## 2019-11-22 DIAGNOSIS — I10 ESSENTIAL HYPERTENSION, BENIGN: ICD-10-CM

## 2019-11-22 RX ORDER — ATENOLOL 50 MG/1
TABLET ORAL
Qty: 90 TAB | Refills: 1 | Status: SHIPPED | OUTPATIENT
Start: 2019-11-22 | End: 2020-05-22

## 2019-11-26 RX ORDER — INDOMETHACIN 50 MG/1
CAPSULE ORAL
Qty: 90 CAP | Refills: 0 | Status: SHIPPED | OUTPATIENT
Start: 2019-11-26 | End: 2020-02-14

## 2019-11-26 NOTE — TELEPHONE ENCOUNTER
Patient called and requested a new Rx for Indomethacin 50mg.      Last visit:9/11/19  Next visit:1/17/20  Previous refill 7/19/19(90cap + 0R)    Requested Prescriptions     Pending Prescriptions Disp Refills    indomethacin (INDOCIN) 50 mg capsule 90 Cap 0     Sig: TAKE ONE CAPSULE BY MOUTH 3 TIMES A DAY AS NEEDED

## 2019-12-18 RX ORDER — FEBUXOSTAT 40 MG/1
TABLET, FILM COATED ORAL
Qty: 90 TAB | Refills: 4 | Status: SHIPPED | OUTPATIENT
Start: 2019-12-18

## 2020-01-10 DIAGNOSIS — E66.01 MORBID OBESITY WITH BMI OF 40.0-44.9, ADULT (HCC): ICD-10-CM

## 2020-01-10 RX ORDER — PHENTERMINE HYDROCHLORIDE 37.5 MG/1
TABLET ORAL
Qty: 90 TAB | Refills: 1 | Status: SHIPPED | OUTPATIENT
Start: 2020-01-10 | End: 2020-06-29

## 2020-02-14 RX ORDER — INDOMETHACIN 50 MG/1
CAPSULE ORAL
Qty: 90 CAP | Refills: 0 | Status: SHIPPED | OUTPATIENT
Start: 2020-02-14 | End: 2020-03-20

## 2020-03-06 ENCOUNTER — TELEPHONE (OUTPATIENT)
Dept: ENDOCRINOLOGY | Age: 50
End: 2020-03-06

## 2020-03-06 NOTE — TELEPHONE ENCOUNTER
----- Message from Alexia Paresh sent at 3/5/2020  4:48 PM EST -----  Regarding: Dr Serenity Ryan  Patient return call    Caller's first and last name and relationship (if not the patient):      Best contact number(s):[phone], pt said anytime after 4:30 pm is best time to reach him,pt said any other day in Friday will work for him, can leave a message if that day will work on any Friday anytime of the day      Whose call is being returned:  Liliam Pollard    Details to clarify the request:regarding r/s his 5/1/2020 appt with Dr Michoacano Camara

## 2020-03-20 RX ORDER — INDOMETHACIN 50 MG/1
CAPSULE ORAL
Qty: 90 CAP | Refills: 0 | Status: SHIPPED | OUTPATIENT
Start: 2020-03-20 | End: 2020-04-27

## 2020-04-24 ENCOUNTER — TELEPHONE (OUTPATIENT)
Dept: ENDOCRINOLOGY | Age: 50
End: 2020-04-24

## 2020-04-24 DIAGNOSIS — E11.65 UNCONTROLLED TYPE 2 DIABETES MELLITUS WITH HYPERGLYCEMIA (HCC): Primary | ICD-10-CM

## 2020-04-24 DIAGNOSIS — E78.1 HIGH TRIGLYCERIDES: ICD-10-CM

## 2020-04-24 DIAGNOSIS — I10 ESSENTIAL HYPERTENSION, BENIGN: ICD-10-CM

## 2020-04-24 NOTE — TELEPHONE ENCOUNTER
----- Message from Virginia Young sent at 4/23/2020  2:41 PM EDT -----  Regarding: Dr Abhijit Tirado first and last name:      Reason for call:pt calling to confirm if his April 30th appt will be switched to a  1:50pm pt said he can use his smart phone       Callback required yes/no and why:  Yes for reason given above    Best contact number(s):[phone]      Details to clarify the request:      Virginia Young

## 2020-04-24 NOTE — TELEPHONE ENCOUNTER
Please make sure he knows his appt was moved from 4/30/20 to 5/1/20 at 1:50pm.  He will need labs ahead of time and I placed an order directly into the [email or web address] system. I have been recommending making an appointment online or over the phone for Maximiliano Felix and all my patients said this has worked out very well for them.

## 2020-04-24 NOTE — TELEPHONE ENCOUNTER
----- Message from Kojo Byrd sent at 4/23/2020  2:41 PM EDT -----  Regarding: Dr Mayte Vásquez first and last name:      Reason for call:pt calling to confirm if his April 30th appt will be switched to a  1:50pm pt said he can use his smart phone       Callback required yes/no and why:  Yes for reason given above    Best contact number(s):[phone]      Details to clarify the request:      Kojo Byrd

## 2020-04-24 NOTE — TELEPHONE ENCOUNTER
Gloria,    Can you re-verify the pt's insurance in [place]? I changed his appt to May 1 and notice that his insurance is saying \"Unverified. \" Pt stated that his insurance has not changed and his current card matches what's in the system as well.

## 2020-04-27 RX ORDER — INDOMETHACIN 50 MG/1
CAPSULE ORAL
Qty: 90 CAP | Refills: 0 | Status: SHIPPED | OUTPATIENT
Start: 2020-04-27 | End: 2020-05-27

## 2020-05-01 ENCOUNTER — VIRTUAL VISIT (OUTPATIENT)
Dept: ENDOCRINOLOGY | Age: 50
End: 2020-05-01

## 2020-05-01 DIAGNOSIS — E66.01 MORBID OBESITY WITH BMI OF 40.0-44.9, ADULT (HCC): ICD-10-CM

## 2020-05-01 DIAGNOSIS — E11.65 UNCONTROLLED TYPE 2 DIABETES MELLITUS WITH HYPERGLYCEMIA (HCC): Primary | ICD-10-CM

## 2020-05-01 DIAGNOSIS — E78.1 HIGH TRIGLYCERIDES: ICD-10-CM

## 2020-05-01 DIAGNOSIS — I10 ESSENTIAL HYPERTENSION, BENIGN: ICD-10-CM

## 2020-05-01 NOTE — PATIENT INSTRUCTIONS
1) Go have your labs drawn next week and I'll be in touch with the results. 2) Please come for a follow up visit on 11/9/20 at 11:50am in our [place] office.

## 2020-05-01 NOTE — PROGRESS NOTES
Chief Complaint   Patient presents with    Diabetes     pcp and pharmacy confirmed       **THIS IS A VIRTUAL VISIT VIA A VIDEO SYNCHRONOUS DISCUSSION. PATIENT AGREED TO HAVE THEIR CARE DELIVERED OVER A DOXY. ME VIDEO VISIT IN PLACE OF THEIR REGULARLY SCHEDULED OFFICE VISIT**    History of Present Illness: Austen Brooks is a 51 y.o. male here for follow up of diabetes. Is currently finishing up in [place] and then will be starting in [facility] next week. Has still been taking 100 units of 70/30 insulin once daily and this is keeping his sugars between  and checks 2-3 times a week. No lows. Thinks his weight is about the same within 5-10 lbs. Compliant with phentermine. Hopes to get his labs drawn next week. Current Outpatient Medications   Medication Sig    indomethacin (INDOCIN) 50 mg capsule TAKE 1 CAPSULE BY MOUTH THREE TIMES A DAY AS NEEDED    phentermine (ADIPEX-P) 37.5 mg tablet TAKE ONE TABLET BY MOUTH EVERY MORNING    febuxostat (ULORIC) 40 mg tab tablet TAKE 1 TABLET BY MOUTH DAILY    atenolol (TENORMIN) 50 mg tablet TAKE 1 TAB BY MOUTH DAILY.  inulin (FIBER GUMMIES PO) Take  by mouth daily. 4 tabs daily    BD VEO INSULIN SYRINGE UF 1 mL 31 gauge x 15/64\" syrg USE AS DIRECTED TWICE DAILY    lansoprazole (PREVACID) 30 mg capsule TAKE ONE CAPSULE TWICE A DAY BEFORE BREAKFAST AND SUPPER FOR REFLUX    metFORMIN ER (GLUCOPHAGE XR) 500 mg tablet TAKE 1 TABLET BY MOUTH TWICE A DAY    insulin NPH/insulin regular (NOVOLIN 70/30 U-100 INSULIN) 100 unit/mL (70-30) injection INJECT 100 UNITS IN THE MORNING AND 50 UNITS AT DINNER OR AS DIRECTED UP  UNITS A DAY (Patient taking differently: INJECT 100 UNITS IN THE MORNING AND 50 UNITS AT DINNER OR AS DIRECTED UP  UNITS A DAY. Pt takes 100 BID)    ONETOUCH VERIO strip USE TO TEST BLOOD SUGAR TWICE DAILY AS DIRECTED     No current facility-administered medications for this visit.       Allergies   Allergen Reactions    Penicillin G Anaphylaxis     \"almost killed me as a child according to my grandma (a nurse)\"     Review of Systems: PER HPI    Physical Examination:  - GENERAL: NCAT, Appears well nourished   - EYES: EOMI, non-icteric, no proptosis   - Ear/Nose/Throat: NCAT, no visible inflammation or masses   - CARDIOVASCULAR: no cyanosis, no visible JVD   - RESPIRATORY: respiratory effort normal without any distress or labored breathing   - MUSCULOSKELETAL: Normal ROM of neck and upper extremities observed   - SKIN: No rash on face  - NEUROLOGIC:  No facial asymmetry (Cranial nerve 7 motor function), No gaze palsy   - PSYCHIATRIC: Normal affect, Normal insight and judgement       Data Reviewed:   - none new for review    Assessment/Plan:     1. DM w/o complication type II, uncontrolled: his most recent Hgb A1c was 6.4% in 7/19 down from 9.8% in 1/19 up from 8.7% in 7/18 up from 8.5% in 1/18 down from 9.6% in 7/17 down from 10.8% in 2/17 down from 12.2% in 9/16 up from 11.9% in 8/16 up from 9.7% in 4/16 down from 9.9% in 4/15 up from 9.3% in 12/14 down from 10.6% in 8/14 up from 8.4% in 4/14 down from 11.2% in 10/13 up from 7.3% in 3/13 down from 7.7% in 1/13 down from 10.2% in August 2012. Home sugars sound good on just once daily insulin. - cont novolin 70/30 insulin 100 units in am only   - cont metformin  mg 2 tabs daily  - check bs 2x daily  - optho UTD 6/12--due now  - foot exam done 7/19  - microalbumin nl 10/12, up to 75 in 4/15, down to 13 in 4/16, up to 50.3 in 7/17, down to 13 in 1/18 and 12.9 in 1/19  - check A1c, cmp and microalbumin next week  - check POC A1c at next visit        2. Hypertension: his BP was at goal < 140/90  - cont atenolol 50 mg daily      3. High triglycerides: Given DM, Goal LDL < 100, non-HDL < 130, and TG < 150.  and non- in 1/13,  and non- in 10/13,  and non- in 4/14, non- in 12/14.  and LDL 81 in 12/14.  in 4/15.   LDL 75 and  in 4/16.  LDL 56 and  in 9/16. LDL 53 and  with weight loss. LDL 66 and  in 7/17. LDL 75 and  in 7/18. LDL 74 and  in 1/19  - diet control for now  - check lipids next week      4. Obesity: Had lost 36 lbs so far since June 2013 on phentermine but weight up 10 lbs since 8/14. I restarted in 9/16 and wt down 23 lbs by 2/17 but up 5 lbs in 7/17 but had been out for 1 month. Restarted and had a \"rush\" sensation so he stopped this but wt down 8 lbs by 1/18. Restarted 1/2 tab daily and worked back up to 1 tab daily and wt down 8 lbs by 7/18. Up 3 lbs by 1/19. Down 9 lbs by 7/19 and stable by 4/20  - cont phentermine 37.5 mg 1 tab daily    Patient Instructions   1) Go have your labs drawn next week and I'll be in touch with the results. 2) Please come for a follow up visit on 11/9/20 at 11:50am in our [place] office.         Follow-up and Dispositions    · Return for 11/9/20 at 11:50am.               Copy sent to:  Bharath Christianson MD    Lab follow up: 5/7/20    Sent him the following message through [address] and a letter:    Resulted Orders   MICROALBUMIN, UR, RAND W/ MICROALB/CREAT RATIO (Collected: 5/4/2020  2:06 PM)   Result Value Ref Range    Creatinine, urine 288.4 Not Estab. mg/dL    Microalbumin, urine 64.8 Not Estab. ug/mL    Microalb/Creat ratio (ug/mg creat.) 22 0 - 29 mg/g creat      Comment:                             Normal:                0 -  29                         Moderately increased: 30 - 300                         Severely increased:       >300                **Please note reference interval change**      Narrative    Performed at:  [address]  [id number]  : Kerry Ellington MD, Phone:  [phone]   HEMOGLOBIN A1C WITH EAG (Collected: 5/4/2020  2:06 PM)   Result Value Ref Range    Hemoglobin A1c 9.0 (H) 4.8 - 5.6 %      Comment:               Prediabetes: 5.7 - 6.4           Diabetes: >6.4 Glycemic control for adults with diabetes: <7.0      Estimated average glucose 212 mg/dL    Narrative    Performed at:  [address]  [id number]  : Yulia Matt MD, Phone:  [phone]   METABOLIC PANEL, COMPREHENSIVE (Collected: 5/4/2020  2:06 PM)   Result Value Ref Range    Glucose 252 (H) 65 - 99 mg/dL    BUN 9 6 - 24 mg/dL    Creatinine 0.82 0.76 - 1.27 mg/dL    GFR est non- >59 mL/min/1.73    GFR est  >59 mL/min/1.73    BUN/Creatinine ratio 11 9 - 20    Sodium 134 134 - 144 mmol/L    Potassium 4.1 3.5 - 5.2 mmol/L    Chloride 96 96 - 106 mmol/L    CO2 23 20 - 29 mmol/L    Calcium 9.5 8.7 - 10.2 mg/dL    Protein, total 6.9 6.0 - 8.5 g/dL    Albumin 4.5 4.0 - 5.0 g/dL    GLOBULIN, TOTAL 2.4 1.5 - 4.5 g/dL    A-G Ratio 1.9 1.2 - 2.2    Bilirubin, total 0.6 0.0 - 1.2 mg/dL    Alk. phosphatase 68 39 - 117 IU/L    AST (SGOT) 28 0 - 40 IU/L    ALT (SGPT) 44 0 - 44 IU/L    Narrative    Performed at:  [address]  [id number]  : Yulia Matt MD, Phone:  [phone]   LIPID PANEL (Collected: 5/4/2020  2:06 PM)   Result Value Ref Range    Cholesterol, total 156 100 - 199 mg/dL    Triglyceride 165 (H) 0 - 149 mg/dL    HDL Cholesterol 30 (L) >39 mg/dL    VLDL, calculated 33 5 - 40 mg/dL    LDL, calculated 93 0 - 99 mg/dL    Narrative    Performed at:  [address]  [id number]  : Yulia Matt MD, Phone:  [phone]   CVD REPORT (Collected: 5/4/2020  2:06 PM)   Result Value Ref Range    INTERPRETATION Note       Comment:      Supplemental report is available.     Narrative    [facility]formed at[facility]  [facility] [place]  [id number]  : Yuliana Massey MD, Phone:  [phone]   DIABETES PATIENT EDUCATION (Collected: 5/4/2020  2:06 PM)   Result Value Ref Range    PDF Image Not applicable     Narrative Performed at:  [address]  [place], [place]  [id number]  : Meghan Toscano MD, Phone:  [phone]       Hemoglobin A1c is a 3 month marker of your diabetes control. Goal is less than 7%. Your value is 9% and is up from 6.4% at last check. This means your sugars are running closer to 212 on average which is higher than what you said at our visit. You may need to check more frequently to find out where you are spiking and I recommend checking 2 hours after meals. Do your best to focus on the meals that are causing you to spike over 180 when checked 2 hours after a meal and limit your portions of these foods. Continue to work on your diet and exercise and take all your medications as directed.  -------------------------------------------------------------------------------------------------------------------  Total Cholesterol is the total number of cholesterol particles in your blood. Goal is less than 200. Triglycerides are the short term fats in your blood. Goal is less than 150. HDL is the good cholesterol in your blood. Goal is more than 50 if you are a woman and 40 if you are a man. LDL is the bad cholesterol in your blood. Goal is less than 100 unless you have heart disease and then goal is under 70. Continue to follow a low cholesterol diet. Try to limit the amount of fried foods, fatty foods, butter, gravy, red meat, ice cream, cheese, and eggs in your diet, which are all high in cholesterol.  -------------------------------------------------------------------------------------------------------------------  BUN and creatinine are markers of kidney function. Your values are normal.  -------------------------------------------------------------------------------------------------------------------  ALT and AST are markers of liver function.   Your values are normal.  -------------------------------------------------------------------------------------------------------------------  Microalbumin/creatinine ratio is a marker of the amount of protein in your urine. Goal is less than 30.   Your value is normal. This indicates that your kidneys are not being affected by your uncontrolled diabetes and/or blood pressure.  -------------------------------------------------------------------------------------------------------------------

## 2020-05-05 LAB
ALBUMIN SERPL-MCNC: 4.5 G/DL (ref 4–5)
ALBUMIN/CREAT UR: 22 MG/G CREAT (ref 0–29)
ALBUMIN/GLOB SERPL: 1.9 {RATIO} (ref 1.2–2.2)
ALP SERPL-CCNC: 68 IU/L (ref 39–117)
ALT SERPL-CCNC: 44 IU/L (ref 0–44)
AST SERPL-CCNC: 28 IU/L (ref 0–40)
BILIRUB SERPL-MCNC: 0.6 MG/DL (ref 0–1.2)
BUN SERPL-MCNC: 9 MG/DL (ref 6–24)
BUN/CREAT SERPL: 11 (ref 9–20)
CALCIUM SERPL-MCNC: 9.5 MG/DL (ref 8.7–10.2)
CHLORIDE SERPL-SCNC: 96 MMOL/L (ref 96–106)
CHOLEST SERPL-MCNC: 156 MG/DL (ref 100–199)
CO2 SERPL-SCNC: 23 MMOL/L (ref 20–29)
CREAT SERPL-MCNC: 0.82 MG/DL (ref 0.76–1.27)
CREAT UR-MCNC: 288.4 MG/DL
EST. AVERAGE GLUCOSE BLD GHB EST-MCNC: 212 MG/DL
GLOBULIN SER CALC-MCNC: 2.4 G/DL (ref 1.5–4.5)
GLUCOSE SERPL-MCNC: 252 MG/DL (ref 65–99)
HBA1C MFR BLD: 9 % (ref 4.8–5.6)
HDLC SERPL-MCNC: 30 MG/DL
INTERPRETATION, 910389: NORMAL
LDLC SERPL CALC-MCNC: 93 MG/DL (ref 0–99)
Lab: NORMAL
MICROALBUMIN UR-MCNC: 64.8 UG/ML
POTASSIUM SERPL-SCNC: 4.1 MMOL/L (ref 3.5–5.2)
PROT SERPL-MCNC: 6.9 G/DL (ref 6–8.5)
SODIUM SERPL-SCNC: 134 MMOL/L (ref 134–144)
TRIGL SERPL-MCNC: 165 MG/DL (ref 0–149)
VLDLC SERPL CALC-MCNC: 33 MG/DL (ref 5–40)

## 2020-05-22 DIAGNOSIS — I10 ESSENTIAL HYPERTENSION, BENIGN: ICD-10-CM

## 2020-05-22 RX ORDER — ATENOLOL 50 MG/1
TABLET ORAL
Qty: 90 TAB | Refills: 1 | Status: SHIPPED | OUTPATIENT
Start: 2020-05-22 | End: 2020-11-09 | Stop reason: SDUPTHER

## 2020-05-27 RX ORDER — INDOMETHACIN 50 MG/1
CAPSULE ORAL
Qty: 90 CAP | Refills: 0 | Status: SHIPPED | OUTPATIENT
Start: 2020-05-27 | End: 2020-06-26

## 2020-06-20 RX ORDER — METFORMIN HYDROCHLORIDE 500 MG/1
TABLET, EXTENDED RELEASE ORAL
Qty: 180 TAB | Refills: 3 | Status: SHIPPED | OUTPATIENT
Start: 2020-06-20 | End: 2021-10-11 | Stop reason: SDUPTHER

## 2020-06-22 RX ORDER — LANSOPRAZOLE 30 MG/1
CAPSULE, DELAYED RELEASE ORAL
Qty: 180 CAP | Refills: 3 | Status: SHIPPED | OUTPATIENT
Start: 2020-06-22 | End: 2021-07-14

## 2020-06-26 RX ORDER — INDOMETHACIN 50 MG/1
CAPSULE ORAL
Qty: 90 CAP | Refills: 0 | Status: SHIPPED | OUTPATIENT
Start: 2020-06-26 | End: 2020-08-14

## 2020-06-28 DIAGNOSIS — E66.01 MORBID OBESITY WITH BMI OF 40.0-44.9, ADULT (HCC): ICD-10-CM

## 2020-06-29 RX ORDER — PHENTERMINE HYDROCHLORIDE 37.5 MG/1
TABLET ORAL
Qty: 90 TAB | Refills: 1 | Status: SHIPPED | OUTPATIENT
Start: 2020-06-29 | End: 2020-07-17

## 2020-07-17 DIAGNOSIS — E66.01 MORBID OBESITY WITH BMI OF 40.0-44.9, ADULT (HCC): ICD-10-CM

## 2020-07-17 RX ORDER — PHENTERMINE HYDROCHLORIDE 37.5 MG/1
TABLET ORAL
Qty: 90 TAB | Refills: 1 | Status: SHIPPED | OUTPATIENT
Start: 2020-07-17 | End: 2021-02-01

## 2020-08-14 RX ORDER — INDOMETHACIN 50 MG/1
CAPSULE ORAL
Qty: 90 CAP | Refills: 0 | Status: SHIPPED | OUTPATIENT
Start: 2020-08-14 | End: 2020-10-06

## 2020-10-06 RX ORDER — INDOMETHACIN 50 MG/1
CAPSULE ORAL
Qty: 90 CAP | Refills: 0 | Status: SHIPPED | OUTPATIENT
Start: 2020-10-06 | End: 2020-11-06

## 2020-11-06 RX ORDER — INDOMETHACIN 50 MG/1
CAPSULE ORAL
Qty: 90 CAP | Refills: 0 | Status: SHIPPED | OUTPATIENT
Start: 2020-11-06 | End: 2020-12-18

## 2020-11-09 ENCOUNTER — VIRTUAL VISIT (OUTPATIENT)
Dept: ENDOCRINOLOGY | Age: 50
End: 2020-11-09
Payer: COMMERCIAL

## 2020-11-09 DIAGNOSIS — E11.65 UNCONTROLLED TYPE 2 DIABETES MELLITUS WITH HYPERGLYCEMIA (HCC): Primary | ICD-10-CM

## 2020-11-09 DIAGNOSIS — E78.1 HIGH TRIGLYCERIDES: ICD-10-CM

## 2020-11-09 DIAGNOSIS — E66.01 MORBID OBESITY WITH BMI OF 40.0-44.9, ADULT (HCC): ICD-10-CM

## 2020-11-09 DIAGNOSIS — I10 ESSENTIAL HYPERTENSION, BENIGN: ICD-10-CM

## 2020-11-09 PROCEDURE — 99214 OFFICE O/P EST MOD 30 MIN: CPT | Performed by: INTERNAL MEDICINE

## 2020-11-09 RX ORDER — ATENOLOL 50 MG/1
TABLET ORAL
Qty: 90 TAB | Refills: 3 | Status: SHIPPED | OUTPATIENT
Start: 2020-11-09 | End: 2021-10-03

## 2020-11-09 NOTE — PROGRESS NOTES
Chief Complaint   Patient presents with    Diabetes     pcp and pharnacy confirmed    Other     [phone] facetime       **THIS IS A VIRTUAL VISIT VIA A VIDEO SYNCHRONOUS DISCUSSION. PATIENT AGREED TO HAVE THEIR CARE DELIVERED OVER A FACETIME VIDEO VISIT IN PLACE OF THEIR REGULARLY SCHEDULED OFFICE VISIT**    History of Present Illness: Sole Rincon is a 53 y.o. male here for follow up of diabetes. Today is his 50th birthday. After last visit, the job in [employer] fell through and he went back to [place]. Has still been taking just one dose of insulin 100 units in the morning when he wakes up but has not been checking his sugars at all. He doesn't tend to eat hardly anything at all during the work day but doesn't go low. He has one large meal when he comes home at night but does not take any insulin before this meal and is willing to switch when he takes his insulin. Still taking metformin and phentermine and atenolol and thinks his weight is about the same. We agreed to not draw any labs now as they won't change my management and just repeat them before his next visit. Current Outpatient Medications   Medication Sig    indomethacin (INDOCIN) 50 mg capsule TAKE 1 CAPSULE BY MOUTH THREE TIMES A DAY AS NEEDED    phentermine (ADIPEX-P) 37.5 mg tablet TAKE ONE TABLET BY MOUTH EVERY MORNING    lansoprazole (PREVACID) 30 mg capsule TAKE ONE CAPSULE TWICE A DAY BEFORE BREAKFAST AND SUPPER FOR REFLUX    metFORMIN ER (GLUCOPHAGE XR) 500 mg tablet TAKE 1 TABLET BY MOUTH TWICE A DAY    atenoloL (TENORMIN) 50 mg tablet TAKE 1 TABLET BY MOUTH EVERY DAY    febuxostat (ULORIC) 40 mg tab tablet TAKE 1 TABLET BY MOUTH DAILY    inulin (FIBER GUMMIES PO) Take  by mouth daily.  4 tabs daily    BD VEO INSULIN SYRINGE UF 1 mL 31 gauge x 15/64\" syrg USE AS DIRECTED TWICE DAILY    insulin NPH/insulin regular (NOVOLIN 70/30 U-100 INSULIN) 100 unit/mL (70-30) injection INJECT 100 UNITS IN THE MORNING AND 50 UNITS AT DINNER OR AS DIRECTED UP  UNITS A DAY (Patient taking differently: INJECT 100 UNITS IN THE MORNING AND 50 UNITS AT DINNER OR AS DIRECTED UP  UNITS A DAY. Pt takes 100 BID)    ONETOUCH VERIO strip USE TO TEST BLOOD SUGAR TWICE DAILY AS DIRECTED     No current facility-administered medications for this visit. Allergies   Allergen Reactions    Penicillin G Anaphylaxis     \"almost killed me as a child according to my grandma (a nurse)\"     Review of Systems: PER HPI    Physical Examination:  - GENERAL: NCAT, Appears well nourished   - EYES: EOMI, non-icteric, no proptosis   - Ear/Nose/Throat: NCAT, no visible inflammation or masses   - CARDIOVASCULAR: no cyanosis, no visible JVD   - RESPIRATORY: respiratory effort normal without any distress or labored breathing   - MUSCULOSKELETAL: Normal ROM of neck and upper extremities observed   - SKIN: No rash on face  - NEUROLOGIC:  No facial asymmetry (Cranial nerve 7 motor function), No gaze palsy   - PSYCHIATRIC: Normal affect, Normal insight and judgement       Data Reviewed:   - none new for review    Assessment/Plan:     1. DM w/o complication type II, uncontrolled: his most recent Hgb A1c was 9% in 5/20 up from 6.4% in 7/19 down from 9.8% in 1/19 up from 8.7% in 7/18 up from 8.5% in 1/18 down from 9.6% in 7/17 down from 10.8% in 2/17 down from 12.2% in 9/16 up from 11.9% in 8/16 up from 9.7% in 4/16 down from 9.9% in 4/15 up from 9.3% in 12/14 down from 10.6% in 8/14 up from 8.4% in 4/14 down from 11.2% in 10/13 up from 7.3% in 3/13 down from 7.7% in 1/13 down from 10.2% in August 2012.   Will move his one shot of insulin to before dinner to see if this gives better control as this is the only meal he is eating.  - cont novolin 70/30 insulin 100 units before dinner only   - cont metformin  mg 2 tabs daily  - check bs 2x daily  - optho UTD 6/12--due now  - foot exam done 7/19  - microalbumin nl 10/12, up to 75 in 4/15, down to 13 in 4/16, up to 50.3 in 7/17, down to 13 in 1/18 and normal ever since, last in 5/20  - check A1c, cmp and microalbumin prior to next visit          2. Hypertension: his BP was at goal < 140/90 in 9/19  - cont atenolol 50 mg daily        3. High triglycerides: Given DM, Goal LDL < 100, non-HDL < 130, and TG < 150.  and non- in 1/13,  and non- in 10/13,  and non- in 4/14, non- in 12/14.  and LDL 81 in 12/14.  in 4/15. LDL 75 and  in 4/16. LDL 56 and  in 9/16. LDL 53 and  with weight loss. LDL 66 and  in 7/17. LDL 75 and  in 7/18. LDL 74 and  in 1/19. LDL 93 and  in 5/20  - diet control for now  - check lipids prior to next visit        4. Obesity: Had lost 36 lbs so far since June 2013 on phentermine but weight up 10 lbs since 8/14. I restarted in 9/16 and wt down 23 lbs by 2/17 but up 5 lbs in 7/17 but had been out for 1 month. Restarted and had a \"rush\" sensation so he stopped this but wt down 8 lbs by 1/18. Restarted 1/2 tab daily and worked back up to 1 tab daily and wt down 8 lbs by 7/18. Up 3 lbs by 1/19.   Down 9 lbs by 7/19 and stable by 4/20 and 11/20  - cont phentermine 37.5 mg 1 tab daily      Follow-up and Dispositions    · Return 5/21/21 at 11:50am.                 Copy sent to:  Clearance Kussmaul, MD

## 2020-12-18 RX ORDER — INDOMETHACIN 50 MG/1
CAPSULE ORAL
Qty: 90 CAP | Refills: 0 | Status: SHIPPED | OUTPATIENT
Start: 2020-12-18

## 2021-02-01 DIAGNOSIS — E66.01 MORBID OBESITY WITH BMI OF 40.0-44.9, ADULT (HCC): ICD-10-CM

## 2021-02-01 RX ORDER — PHENTERMINE HYDROCHLORIDE 37.5 MG/1
TABLET ORAL
Qty: 90 TAB | Refills: 1 | Status: SHIPPED | OUTPATIENT
Start: 2021-02-01 | End: 2021-02-03

## 2021-02-03 DIAGNOSIS — E66.01 MORBID OBESITY WITH BMI OF 40.0-44.9, ADULT (HCC): ICD-10-CM

## 2021-02-03 RX ORDER — PHENTERMINE HYDROCHLORIDE 37.5 MG/1
TABLET ORAL
Qty: 90 TAB | Refills: 1 | Status: SHIPPED | OUTPATIENT
Start: 2021-02-03 | End: 2021-04-21 | Stop reason: ALTCHOICE

## 2021-04-21 ENCOUNTER — OFFICE VISIT (OUTPATIENT)
Dept: FAMILY MEDICINE CLINIC | Age: 51
End: 2021-04-21
Payer: COMMERCIAL

## 2021-04-21 VITALS
SYSTOLIC BLOOD PRESSURE: 142 MMHG | BODY MASS INDEX: 32.1 KG/M2 | HEART RATE: 63 BPM | RESPIRATION RATE: 16 BRPM | TEMPERATURE: 97.1 F | HEIGHT: 72 IN | WEIGHT: 237 LBS | DIASTOLIC BLOOD PRESSURE: 91 MMHG | OXYGEN SATURATION: 98 %

## 2021-04-21 DIAGNOSIS — I10 ESSENTIAL HYPERTENSION, BENIGN: ICD-10-CM

## 2021-04-21 DIAGNOSIS — E11.42 DIABETIC POLYNEUROPATHY ASSOCIATED WITH TYPE 2 DIABETES MELLITUS (HCC): Primary | ICD-10-CM

## 2021-04-21 DIAGNOSIS — E11.42 CONTROLLED TYPE 2 DIABETES MELLITUS WITH DIABETIC POLYNEUROPATHY, WITHOUT LONG-TERM CURRENT USE OF INSULIN (HCC): ICD-10-CM

## 2021-04-21 LAB
ALBUMIN SERPL-MCNC: 4.6 G/DL (ref 3.5–5)
ALBUMIN/GLOB SERPL: 1.4 {RATIO} (ref 1.1–2.2)
ALP SERPL-CCNC: 94 U/L (ref 45–117)
ALT SERPL-CCNC: 54 U/L (ref 12–78)
ANION GAP SERPL CALC-SCNC: 5 MMOL/L (ref 5–15)
AST SERPL-CCNC: 24 U/L (ref 15–37)
BILIRUB SERPL-MCNC: 0.6 MG/DL (ref 0.2–1)
BUN SERPL-MCNC: 12 MG/DL (ref 6–20)
BUN/CREAT SERPL: 11 (ref 12–20)
CALCIUM SERPL-MCNC: 10.1 MG/DL (ref 8.5–10.1)
CHLORIDE SERPL-SCNC: 105 MMOL/L (ref 97–108)
CHOLEST SERPL-MCNC: 154 MG/DL
CO2 SERPL-SCNC: 27 MMOL/L (ref 21–32)
CREAT SERPL-MCNC: 1.14 MG/DL (ref 0.7–1.3)
EST. AVERAGE GLUCOSE BLD GHB EST-MCNC: 148 MG/DL
GLOBULIN SER CALC-MCNC: 3.4 G/DL (ref 2–4)
GLUCOSE SERPL-MCNC: 147 MG/DL (ref 65–100)
HBA1C MFR BLD: 6.8 % (ref 4–5.6)
HDLC SERPL-MCNC: 34 MG/DL
HDLC SERPL: 4.5 {RATIO} (ref 0–5)
LDLC SERPL CALC-MCNC: 84.2 MG/DL (ref 0–100)
LIPID PROFILE,FLP: ABNORMAL
POTASSIUM SERPL-SCNC: 5.2 MMOL/L (ref 3.5–5.1)
PROT SERPL-MCNC: 8 G/DL (ref 6.4–8.2)
SODIUM SERPL-SCNC: 137 MMOL/L (ref 136–145)
TRIGL SERPL-MCNC: 179 MG/DL (ref ?–150)
VLDLC SERPL CALC-MCNC: 35.8 MG/DL

## 2021-04-21 PROCEDURE — 99213 OFFICE O/P EST LOW 20 MIN: CPT | Performed by: FAMILY MEDICINE

## 2021-04-21 RX ORDER — AMITRIPTYLINE HYDROCHLORIDE 25 MG/1
25 TABLET, FILM COATED ORAL
Qty: 30 TAB | Refills: 5 | Status: SHIPPED | OUTPATIENT
Start: 2021-04-21 | End: 2021-05-13 | Stop reason: SDUPTHER

## 2021-04-21 RX ORDER — DULOXETIN HYDROCHLORIDE 30 MG/1
30 CAPSULE, DELAYED RELEASE ORAL DAILY
COMMUNITY
End: 2021-05-21 | Stop reason: ALTCHOICE

## 2021-04-21 RX ORDER — DULOXETIN HYDROCHLORIDE 60 MG/1
60 CAPSULE, DELAYED RELEASE ORAL DAILY
Qty: 90 CAP | Refills: 3 | Status: SHIPPED | OUTPATIENT
Start: 2021-04-21 | End: 2022-03-30

## 2021-04-21 NOTE — PROGRESS NOTES
HISTORY OF PRESENT ILLNESS  Ovi Kiser is a 53 y.o. male. HPIHas been having burning in both feet for several months. Symptoms are much worse at night. Makes it difficult to fall asleep. Is also going thru opioid detoxification thru the [facility]. Was taking tylenol and aleve for pain, but was worried about his liver. Was taking up to 8-10 percocet 10 mg a day. Is currently on diazepam 10 mg, baclofen 10 mg , clonidine 0.1 mg, olanzapine 10 mg. Is also on a naltrexone implant for the next 2 months. ROS    Physical Exam  Vitals signs and nursing note reviewed. Constitutional:       Appearance: He is well-developed. HENT:      Right Ear: External ear normal.      Left Ear: External ear normal.   Neck:      Thyroid: No thyromegaly. Cardiovascular:      Rate and Rhythm: Normal rate and regular rhythm. Heart sounds: Normal heart sounds. Pulmonary:      Effort: Pulmonary effort is normal. No respiratory distress. Breath sounds: Normal breath sounds. No wheezing. Abdominal:      General: Bowel sounds are normal. There is no distension. Palpations: Abdomen is soft. There is no mass. Tenderness: There is no abdominal tenderness. There is no guarding. Musculoskeletal: Normal range of motion. Lymphadenopathy:      Cervical: No cervical adenopathy. Neurological:      Comments: Absent sensation in feet. ASSESSMENT and PLAN  Orders Placed This Encounter    METABOLIC PANEL, COMPREHENSIVE    LIPID PANEL    HEMOGLOBIN A1C WITH EAG    amitriptyline (ELAVIL) 25 mg tablet    DULoxetine (CYMBALTA) 60 mg capsule     Diagnoses and all orders for this visit:    1. Diabetic polyneuropathy associated with type 2 diabetes mellitus ([facility] [address]    2. Essential hypertension, benign  -     METABOLIC PANEL, COMPREHENSIVE; Future    3. Controlled type 2 diabetes mellitus with diabetic polyneuropathy, without long-term current use of insulin ([facility])  -     LIPID PANEL;  Future  - HEMOGLOBIN A1C WITH EAG; Future    Other orders  -     amitriptyline (ELAVIL) 25 mg tablet; Take 1 Tab by mouth nightly. For neuropathy  -     DULoxetine (CYMBALTA) 60 mg capsule; Take 1 Cap by mouth daily.  Start after finishing the 30 mg duloxetine prescription

## 2021-04-21 NOTE — PROGRESS NOTES
Chief Complaint   Patient presents with    Foot Pain     bilateral foot pain     1. Have you been to the ER, urgent care clinic since your last visit? Hospitalized since your last visit? Yes Detox    2. Have you seen or consulted any other health care providers outside of the [address] [place] [address] since your last visit? Include any pap smears or colon screening. No     Patient having neuropathy pain.

## 2021-04-26 RX ORDER — ATORVASTATIN CALCIUM 20 MG/1
20 TABLET, FILM COATED ORAL DAILY
Qty: 90 TAB | Refills: 3 | Status: SHIPPED | OUTPATIENT
Start: 2021-04-26 | End: 2022-03-30

## 2021-05-07 DIAGNOSIS — E66.01 MORBID OBESITY WITH BMI OF 40.0-44.9, ADULT (HCC): ICD-10-CM

## 2021-05-07 DIAGNOSIS — E78.1 HIGH TRIGLYCERIDES: ICD-10-CM

## 2021-05-07 DIAGNOSIS — I10 ESSENTIAL HYPERTENSION, BENIGN: ICD-10-CM

## 2021-05-07 DIAGNOSIS — E11.65 UNCONTROLLED TYPE 2 DIABETES MELLITUS WITH HYPERGLYCEMIA (HCC): ICD-10-CM

## 2021-05-13 RX ORDER — AMITRIPTYLINE HYDROCHLORIDE 25 MG/1
25 TABLET, FILM COATED ORAL
Qty: 90 TAB | Refills: 1 | Status: SHIPPED | OUTPATIENT
Start: 2021-05-13 | End: 2021-11-04

## 2021-05-13 NOTE — TELEPHONE ENCOUNTER
Last visit:4/21/21  Next visit: not scheduled  Previous refill 4/21/21(30+5R) Patient is asking for a 90 day supply    Requested Prescriptions     Pending Prescriptions Disp Refills    amitriptyline (ELAVIL) 25 mg tablet 90 Tab 1     Sig: Take 1 Tab by mouth nightly.  For neuropathy

## 2021-05-21 ENCOUNTER — OFFICE VISIT (OUTPATIENT)
Dept: ENDOCRINOLOGY | Age: 51
End: 2021-05-21
Payer: COMMERCIAL

## 2021-05-21 VITALS
HEART RATE: 79 BPM | BODY MASS INDEX: 32.41 KG/M2 | SYSTOLIC BLOOD PRESSURE: 122 MMHG | WEIGHT: 239 LBS | DIASTOLIC BLOOD PRESSURE: 80 MMHG

## 2021-05-21 DIAGNOSIS — E11.65 UNCONTROLLED TYPE 2 DIABETES MELLITUS WITH HYPERGLYCEMIA (HCC): Primary | ICD-10-CM

## 2021-05-21 DIAGNOSIS — E78.1 HIGH TRIGLYCERIDES: ICD-10-CM

## 2021-05-21 DIAGNOSIS — I10 ESSENTIAL HYPERTENSION, BENIGN: ICD-10-CM

## 2021-05-21 DIAGNOSIS — E66.01 MORBID OBESITY WITH BMI OF 40.0-44.9, ADULT (HCC): ICD-10-CM

## 2021-05-21 PROCEDURE — 99214 OFFICE O/P EST MOD 30 MIN: CPT | Performed by: INTERNAL MEDICINE

## 2021-05-21 NOTE — PROGRESS NOTES
Chief Complaint   Patient presents with    Diabetes     pcp and pharmacy confirmed     History of Present Illness: Deaynira Klein is a 53 y.o. male here for follow up of diabetes. Has moved the insulin from morning to dinner and taking the metformin 2 tabs together in the morning. Not checking sugars at all but has continued to lose weight and is down from 266 in 9/19 to 239 today. Was started on lipitor by Dr. Nasreen Cooper after his labs were drawn in 4/21 and his 10 year risk of CV disease is 19% so I agree with this. Was also started on cymbalta and the 60 mg dose is helping the pain in his feet and just started this 2 weeks ago after taking 30 mg daily before this. Current Outpatient Medications   Medication Sig    amitriptyline (ELAVIL) 25 mg tablet Take 1 Tab by mouth nightly. For neuropathy    atorvastatin (LIPITOR) 20 mg tablet Take 1 Tab by mouth daily. For cholesterol    DULoxetine (CYMBALTA) 60 mg capsule Take 1 Cap by mouth daily. Start after finishing the 30 mg duloxetine prescription    indomethacin (INDOCIN) 50 mg capsule TAKE 1 CAPSULE BY MOUTH THREE TIMES A DAY AS NEEDED    insulin NPH/insulin regular (NovoLIN 70/30 U-100 Insulin) 100 unit/mL (70-30) injection INJECT 50 UNITS BEFORE BREAKFAST (WHEN EATING DURING THE DAY)  UNITS AT DINNER OR AS DIRECTED UP  UNITS A DAY    atenoloL (TENORMIN) 50 mg tablet TAKE 1 TABLET BY MOUTH EVERY DAY    lansoprazole (PREVACID) 30 mg capsule TAKE ONE CAPSULE TWICE A DAY BEFORE BREAKFAST AND SUPPER FOR REFLUX    metFORMIN ER (GLUCOPHAGE XR) 500 mg tablet TAKE 1 TABLET BY MOUTH TWICE A DAY    febuxostat (ULORIC) 40 mg tab tablet TAKE 1 TABLET BY MOUTH DAILY    inulin (FIBER GUMMIES PO) Take  by mouth daily. 4 tabs daily    BD VEO INSULIN SYRINGE UF 1 mL 31 gauge x 15/64\" syrg USE AS DIRECTED TWICE DAILY    ONETOUCH VERIO strip USE TO TEST BLOOD SUGAR TWICE DAILY AS DIRECTED     No current facility-administered medications for this visit. Allergies   Allergen Reactions    Penicillin G Anaphylaxis     \"almost killed me as a child according to my grandma (a nurse)\"     Review of Systems: PER HPI    Physical Examination:  Blood pressure 122/80, pulse 79, weight 239 lb (108.4 kg). - General: pleasant, no distress, good eye contact   - Neck: no carotid bruits  - Cardiovascular: regular, normal rate, nl s1 and s2, no m/r/g,   - Respiratory: clear bilaterally  - Integumentary: no edema,   - Psychiatric: normal mood and affect    Diabetic foot exam:     Left Foot:   Visual Exam: callous - mild   Pulse DP: 2+ (normal)   Filament test: reduced sensation    Vibratory sensation: diminished      Right Foot:   Visual Exam: callous - mild   Pulse DP: 2+ (normal)   Filament test: reduced sensation    Vibratory sensation: diminished        Data Reviewed:   Component      Latest Ref Rng & Units 4/21/2021 4/21/2021 4/21/2021           1:24 PM  1:24 PM  1:24 PM   Sodium      136 - 145 mmol/L 137     Potassium      3.5 - 5.1 mmol/L 5.2 (H)     Chloride      97 - 108 mmol/L 105     CO2      21 - 32 mmol/L 27     Anion gap      5 - 15 mmol/L 5     Glucose      65 - 100 mg/dL 147 (H)     BUN      6 - 20 MG/DL 12     Creatinine      0.70 - 1.30 MG/DL 1.14     BUN/Creatinine ratio      12 - 20   11 (L)     GFR est AA      >60 ml/min/1.73m2 >60     GFR est non-AA      >60 ml/min/1.73m2 >60     Calcium      8.5 - 10.1 MG/DL 10.1     Bilirubin, total      0.2 - 1.0 MG/DL 0.6     ALT      12 - 78 U/L 54     AST      15 - 37 U/L 24     Alk.  phosphatase      45 - 117 U/L 94     Protein, total      6.4 - 8.2 g/dL 8.0     Albumin      3.5 - 5.0 g/dL 4.6     Globulin      2.0 - 4.0 g/dL 3.4     A-G Ratio      1.1 - 2.2   1.4     Cholesterol, total      <200 MG/DL  154    Triglyceride      <150 MG/DL  179 (H)    HDL Cholesterol      MG/DL  34    LDL, calculated      0 - 100 MG/DL  84.2    VLDL, calculated      MG/DL  35.8    CHOL/HDL Ratio      0.0 - 5.0    4.5    Hemoglobin A1c, (calculated)      4.0 - 5.6 %   6.8 (H)   Est. average glucose      mg/dL   148       Assessment/Plan:     1. DM w/o complication type II, uncontrolled: his most recent Hgb A1c was 6.8% in 4/21 down from 9% in 5/20 up from 6.4% in 7/19 down from 9.8% in 1/19 up from 8.7% in 7/18 up from 8.5% in 1/18 down from 9.6% in 7/17 down from 10.8% in 2/17 down from 12.2% in 9/16 up from 11.9% in 8/16 up from 9.7% in 4/16 down from 9.9% in 4/15 up from 9.3% in 12/14 down from 10.6% in 8/14 up from 8.4% in 4/14 down from 11.2% in 10/13 up from 7.3% in 3/13 down from 7.7% in 1/13 down from 10.2% in August 2012. A1c is very good so won't make any changes. - cont novolin 70/30 insulin 100 units before dinner only   - cont metformin  mg 2 tabs daily in the morning  - check bs 2x daily  - optho UTD 6/12--due now  - foot exam done 5/21  - microalbumin nl 10/12, up to 75 in 4/15, down to 13 in 4/16, up to 50.3 in 7/17, down to 13 in 1/18 and normal ever since, last in 5/20  - check A1c, cmp and microalbumin prior to next visit          2. Hypertension: his BP was at goal < 140/90  - cont atenolol 50 mg daily        3. High triglycerides: Given DM, Goal LDL < 100, non-HDL < 130, and TG < 150.  and non- in 1/13,  and non- in 10/13,  and non- in 4/14, non- in 12/14.  and LDL 81 in 12/14.  in 4/15. LDL 75 and  in 4/16. LDL 56 and  in 9/16. LDL 53 and  with weight loss. LDL 66 and  in 7/17. LDL 75 and  in 7/18. LDL 74 and  in 1/19. LDL 93 and  in 5/20. LDL 84 and  in 4/21. Started on lipitor 20 at that time  - cont lipitor 20 mg daily  - check lipids prior to next visit        4. Obesity: Had lost 36 lbs so far since June 2013 on phentermine but weight up 10 lbs since 8/14. I restarted in 9/16 and wt down 23 lbs by 2/17 but up 5 lbs in 7/17 but had been out for 1 month.   Restarted and had a \"rush\" sensation so he stopped this but wt down 8 lbs by 1/18. Restarted 1/2 tab daily and worked back up to 1 tab daily and wt down 8 lbs by 7/18. Up 3 lbs by 1/19. Down 9 lbs by 7/19. Down 27 lbs by 5/21  - cont phentermine 37.5 mg 1 tab daily    Patient Instructions   1) Your Hemoglobin A1c (3 month test of blood sugar) was 6.8% down from 9% in 5/20 and at goal under 7%. 2) Your have lost from 266 in 9/19 to 239 today. Keep up the good work. 3) Your 10 year risk of heart disease was 19% and anything over 7.5% is considered high risk so I agree with using atorvastatin to lower your cholesterol to prevent heart attack and stroke. 4) BUN and creatinine are markers of kidney function. Your values are normal.    5) ALT and AST are markers of liver function. Your values are normal.    6) If you have worsening nerve pain in the feet despite taking 60 mg of duloxetine (cymbalta) at night, then let me know and we can try 1 cap twice daily. 7) Your blood pressure is controlled. 8) I put an order directly into the [facility] system to repeat your labs in the 1-2 weeks prior to your next visit so just ask for the order under my name and you will receive a courtesy reminder through [employer] to have these drawn.           Follow-up and Dispositions    · Return 12/3/21 at 11:50am.               Copy sent to:  Krishna Reilly MD

## 2021-05-21 NOTE — PATIENT INSTRUCTIONS
1) Your Hemoglobin A1c (3 month test of blood sugar) was 6.8% down from 9% in 5/20 and at goal under 7%. 2) Your have lost from 266 in 9/19 to 239 today. Keep up the good work. 3) Your 10 year risk of heart disease was 19% and anything over 7.5% is considered high risk so I agree with using atorvastatin to lower your cholesterol to prevent heart attack and stroke. 4) BUN and creatinine are markers of kidney function. Your values are normal. 
 
5) ALT and AST are markers of liver function. Your values are normal. 
 
6) If you have worsening nerve pain in the feet despite taking 60 mg of duloxetine (cymbalta) at night, then let me know and we can try 1 cap twice daily. 7) Your blood pressure is controlled. 8) I put an order directly into the [facility] system to repeat your labs in the 1-2 weeks prior to your next visit so just ask for the order under my name and you will receive a courtesy reminder through [facility] [employer] to have these drawn.

## 2021-07-14 RX ORDER — LANSOPRAZOLE 30 MG/1
CAPSULE, DELAYED RELEASE ORAL
Qty: 180 CAPSULE | Refills: 3 | Status: SHIPPED | OUTPATIENT
Start: 2021-07-14

## 2021-10-03 RX ORDER — ATENOLOL 50 MG/1
TABLET ORAL
Qty: 90 TABLET | Refills: 3 | Status: SHIPPED | OUTPATIENT
Start: 2021-10-03 | End: 2022-09-26

## 2021-10-11 ENCOUNTER — TELEPHONE (OUTPATIENT)
Dept: ENDOCRINOLOGY | Age: 51
End: 2021-10-11

## 2021-10-11 RX ORDER — METFORMIN HYDROCHLORIDE 500 MG/1
1000 TABLET, EXTENDED RELEASE ORAL DAILY
Qty: 180 TABLET | Refills: 3 | Status: SHIPPED | OUTPATIENT
Start: 2021-10-11 | End: 2022-04-29

## 2021-10-11 NOTE — TELEPHONE ENCOUNTER
----- Message from Rocio Natali III sent at 10/11/2021 10:14 AM EDT -----  Regarding: /Refill  General Message/Vendor Calls     Caller's first and last name: Self         Reason for call: Medication Refill         Callback required yes/no and why: yEs to confirm         Best contact number(s):[phone]        Details to clarify the request: Pt need a refill on [facility]         [employer]

## 2021-10-11 NOTE — TELEPHONE ENCOUNTER
I attempted to call Mr. Melody Cummings and reached his voice mail.  I left a message letting him know that the script had been sent to [facility]

## 2021-11-04 RX ORDER — AMITRIPTYLINE HYDROCHLORIDE 25 MG/1
25 TABLET, FILM COATED ORAL
Qty: 90 TABLET | Refills: 1 | Status: SHIPPED | OUTPATIENT
Start: 2021-11-04 | End: 2022-04-29

## 2021-11-19 DIAGNOSIS — E78.1 HIGH TRIGLYCERIDES: ICD-10-CM

## 2021-11-19 DIAGNOSIS — E66.01 MORBID OBESITY WITH BMI OF 40.0-44.9, ADULT (HCC): ICD-10-CM

## 2021-11-19 DIAGNOSIS — I10 ESSENTIAL HYPERTENSION, BENIGN: ICD-10-CM

## 2021-11-19 DIAGNOSIS — E11.65 UNCONTROLLED TYPE 2 DIABETES MELLITUS WITH HYPERGLYCEMIA (HCC): ICD-10-CM

## 2022-03-19 PROBLEM — E11.21 TYPE 2 DIABETES MELLITUS WITH NEPHROPATHY (HCC): Status: ACTIVE | Noted: 2018-01-19

## 2022-03-30 RX ORDER — ATORVASTATIN CALCIUM 20 MG/1
TABLET, FILM COATED ORAL
Qty: 90 TABLET | Refills: 3 | Status: SHIPPED | OUTPATIENT
Start: 2022-03-30

## 2022-03-30 RX ORDER — DULOXETIN HYDROCHLORIDE 60 MG/1
60 CAPSULE, DELAYED RELEASE ORAL DAILY
Qty: 90 CAPSULE | Refills: 3 | Status: SHIPPED | OUTPATIENT
Start: 2022-03-30

## 2022-04-29 ENCOUNTER — OFFICE VISIT (OUTPATIENT)
Dept: ENDOCRINOLOGY | Age: 52
End: 2022-04-29
Payer: COMMERCIAL

## 2022-04-29 VITALS
HEART RATE: 96 BPM | SYSTOLIC BLOOD PRESSURE: 116 MMHG | HEIGHT: 72 IN | BODY MASS INDEX: 34.16 KG/M2 | DIASTOLIC BLOOD PRESSURE: 86 MMHG | WEIGHT: 252.2 LBS

## 2022-04-29 DIAGNOSIS — E11.65 UNCONTROLLED TYPE 2 DIABETES MELLITUS WITH HYPERGLYCEMIA (HCC): Primary | ICD-10-CM

## 2022-04-29 DIAGNOSIS — I10 ESSENTIAL HYPERTENSION, BENIGN: ICD-10-CM

## 2022-04-29 DIAGNOSIS — E66.01 MORBID OBESITY WITH BMI OF 40.0-44.9, ADULT (HCC): ICD-10-CM

## 2022-04-29 DIAGNOSIS — E78.1 HIGH TRIGLYCERIDES: ICD-10-CM

## 2022-04-29 LAB — HBA1C MFR BLD HPLC: 10.5 %

## 2022-04-29 PROCEDURE — 3046F HEMOGLOBIN A1C LEVEL >9.0%: CPT | Performed by: INTERNAL MEDICINE

## 2022-04-29 PROCEDURE — 83036 HEMOGLOBIN GLYCOSYLATED A1C: CPT | Performed by: INTERNAL MEDICINE

## 2022-04-29 PROCEDURE — 99214 OFFICE O/P EST MOD 30 MIN: CPT | Performed by: INTERNAL MEDICINE

## 2022-04-29 RX ORDER — METFORMIN HYDROCHLORIDE 500 MG/1
1000 TABLET, EXTENDED RELEASE ORAL 2 TIMES DAILY
Qty: 360 TABLET | Refills: 3 | Status: SHIPPED | OUTPATIENT
Start: 2022-04-29 | End: 2022-10-10 | Stop reason: SDUPTHER

## 2022-04-29 RX ORDER — AMITRIPTYLINE HYDROCHLORIDE 25 MG/1
25 TABLET, FILM COATED ORAL
Qty: 90 TABLET | Refills: 1 | Status: SHIPPED | OUTPATIENT
Start: 2022-04-29 | End: 2022-10-30

## 2022-04-29 NOTE — PROGRESS NOTES
Chief Complaint   Patient presents with    Diabetes     pcp and pharmacy confirmed     History of Present Illness: Alix Merchant is a 49 y.o. male here for follow up of diabetes. Weight up 13 lbs since last visit in 5/21. Hasn't been as consistent with his insulin and may be missing his evening dose of insulin a few times a week. Compliant with his pills. Not checking sugars at all. Doesn't think his diet is necessarily any worse. Was just laid off last week after working up in [employer] for the past few months. No longer on phentermine at this time but may restart in the future. Current Outpatient Medications   Medication Sig    DULoxetine (CYMBALTA) 60 mg capsule TAKE 1 CAP BY MOUTH DAILY. START AFTER FINISHING THE 30 MG DULOXETINE PRESCRIPTION    atorvastatin (LIPITOR) 20 mg tablet TAKE 1 TABLET BY MOUTH EVERY DAY FOR CHOLESTEROL    amitriptyline (ELAVIL) 25 mg tablet TAKE 1 TAB BY MOUTH NIGHTLY FOR NEUROPATHY    metFORMIN ER (GLUCOPHAGE XR) 500 mg tablet Take 2 Tablets by mouth daily. In the AM    atenoloL (TENORMIN) 50 mg tablet TAKE 1 TABLET BY MOUTH EVERY DAY    lansoprazole (PREVACID) 30 mg capsule TAKE 1 CAPSULE BY MOUTH TWICE A DAY BEFORE BREAKFAST AND SUPPER FOR REFLUX    indomethacin (INDOCIN) 50 mg capsule TAKE 1 CAPSULE BY MOUTH THREE TIMES A DAY AS NEEDED    insulin NPH/insulin regular (NovoLIN 70/30 U-100 Insulin) 100 unit/mL (70-30) injection INJECT 50 UNITS BEFORE BREAKFAST (WHEN EATING DURING THE DAY)  UNITS AT DINNER OR AS DIRECTED UP  UNITS A DAY    febuxostat (ULORIC) 40 mg tab tablet TAKE 1 TABLET BY MOUTH DAILY    BD VEO INSULIN SYRINGE UF 1 mL 31 gauge x 15/64\" syrg USE AS DIRECTED TWICE DAILY    ONETOUCH VERIO strip USE TO TEST BLOOD SUGAR TWICE DAILY AS DIRECTED     No current facility-administered medications for this visit.      Allergies   Allergen Reactions    Penicillin G Anaphylaxis     \"almost killed me as a child according to my grandma (a nurse)\"     Review of Systems: PER HPI    Physical Examination:  Blood pressure 116/86, pulse 96, height 6' (1.829 m), weight 252 lb 3.2 oz (114.4 kg). - General: pleasant, no distress, good eye contact   - Neck: no carotid bruits  - Cardiovascular: regular, normal rate, nl s1 and s2, no m/r/g,   - Respiratory: clear bilaterally  - Integumentary: no edema,   - Psychiatric: normal mood and affect    Diabetic foot exam:     Left Foot:   Visual Exam: callous - mild   Pulse DP: 2+ (normal)   Filament test: normal sensation    Vibratory sensation: diminished      Right Foot:   Visual Exam: callous - mild   Pulse DP: 2+ (normal)   Filament test: normal sensation    Vibratory sensation: diminished        Data Reviewed:   Component      Latest Ref Rng & Units 4/29/2022          10:00 AM   Hemoglobin A1c (POC)      % 10.5       Assessment/Plan:     1. DM w/o complication type II, uncontrolled: his most recent Hgb A1c was 10.5% in 4/22 up from 6.8% in 4/21 down from 9% in 5/20 up from 6.4% in 7/19 down from 9.8% in 1/19 up from 8.7% in 7/18 up from 8.5% in 1/18 down from 9.6% in 7/17 down from 10.8% in 2/17 down from 12.2% in 9/16 up from 11.9% in 8/16 up from 9.7% in 4/16 down from 9.9% in 4/15 up from 9.3% in 12/14 down from 10.6% in 8/14 up from 8.4% in 4/14 down from 11.2% in 10/13 up from 7.3% in 3/13 down from 7.7% in 1/13 down from 10.2% in August 2012. A1c is worse due to missing more evening doses. Will try to max out metformin. - cont novolin 70/30 insulin 100 units before dinner only   - increase metformin  mg to 3 tabs daily in the morning for 1-2 weeks and then to 4 tabs daily as tolerated  - check bs 2x daily  - optho UTD 6/12--due now  - foot exam done 4/22  - microalbumin nl 10/12, up to 75 in 4/15, down to 13 in 4/16, up to 50.3 in 7/17, down to 13 in 1/18 and normal ever since, last in 5/20  - check cmp and microalbumin today          2.  Hypertension: his BP was at goal < 140/90  - cont atenolol 50 mg daily        3. High triglycerides: Given DM, Goal LDL < 100, non-HDL < 130, and TG < 150.  and non- in 1/13,  and non- in 10/13,  and non- in 4/14, non- in 12/14.  and LDL 81 in 12/14.  in 4/15. LDL 75 and  in 4/16. LDL 56 and  in 9/16. LDL 53 and  with weight loss. LDL 66 and  in 7/17. LDL 75 and  in 7/18. LDL 74 and  in 1/19. LDL 93 and  in 5/20. LDL 84 and  in 4/21. Started on lipitor 20 at that time  - cont lipitor 20 mg daily  - check lipids today        4. Obesity: Had lost 36 lbs so far since June 2013 on phentermine but weight up 10 lbs since 8/14. I restarted in 9/16 and wt down 23 lbs by 2/17 but up 5 lbs in 7/17 but had been out for 1 month. Restarted and had a \"rush\" sensation so he stopped this but wt down 8 lbs by 1/18. Restarted 1/2 tab daily and worked back up to 1 tab daily and wt down 8 lbs by 7/18. Up 3 lbs by 1/19. Down 9 lbs by 7/19. Down 27 lbs by 5/21. Off phentermine since then and up 13 lbs by 4/22. - off phentermine 37.5 mg 1 tab daily    Patient Instructions   1) Please call [phone] to reset your [facility] [employer] password if you can't get in so I can send you the lab results. 2) Your Hemoglobin A1c (3 month test of blood sugar) was 10.5% up from 6.8% likely due to missing the evening dose of insulin more frequently than a year ago. Do your best not to miss any doses but if you do miss, you can take 50 units in the morning when you wake up. 3) Try taking metformin 3 tabs in the morning as long as you don't have any increase in GI side effects (stomach pain or nausea or diarrhea), then after 1-2 week, you can try to go to the max dose which is 4 tabs daily. If you do have more side effects, you can try splitting the dose to 2 tabs in the morning and 2 tab at night. Follow-up and Dispositions    · Return in about 6 months (around 10/29/2022). Copy sent to:  Peggy Jennings MD    Lab follow up: 04/30/22  Component      Latest Ref Rng & Units 4/29/2022 4/29/2022          10:53 AM 10:53 AM   Sodium      136 - 145 mmol/L  133 (L)   Potassium      3.5 - 5.1 mmol/L  4.9   Chloride      97 - 108 mmol/L  97   CO2      21 - 32 mmol/L  26   Anion gap      5 - 15 mmol/L  10   Glucose      65 - 100 mg/dL  298 (H)   BUN      6 - 20 MG/DL  11   Creatinine      0.70 - 1.30 MG/DL  1.04   BUN/Creatinine ratio      12 - 20    11 (L)   GFR est AA      >60 ml/min/1.73m2  >60   GFR est non-AA      >60 ml/min/1.73m2  >60   Calcium      8.5 - 10.1 MG/DL  9.5   Bilirubin, total      0.2 - 1.0 MG/DL  0.8   ALT      12 - 78 U/L  84 (H)   AST      15 - 37 U/L  38 (H)   Alk. phosphatase      45 - 117 U/L  104   Protein, total      6.4 - 8.2 g/dL  8.3 (H)   Albumin      3.5 - 5.0 g/dL  4.4   Globulin      2.0 - 4.0 g/dL  3.9   A-G Ratio      1.1 - 2.2    1.1   Cholesterol, total      <200 MG/    Triglyceride      <150 MG/ (H)    HDL Cholesterol      MG/DL 33    LDL, calculated      0 - 100 MG/DL 78.6    VLDL, calculated      MG/DL 40.4    CHOL/HDL Ratio      0.0 - 5.0   4.6      Sent him the following message through [employer]:    Total Cholesterol is the total number of cholesterol particles in your blood. Goal is less than 200. Triglycerides are the short term fats in your blood. Goal is less than 150. HDL is the good cholesterol in your blood. Goal is more than 50 if you are a woman and 40 if you are a man. LDL is the bad cholesterol in your blood. Goal is less than 100 unless you have a history of heart disease or stroke and then goal is under 70. Your cholesterol is at goal aside from your triglycerides. This value can be elevated when your blood sugars are uncontrolled so hopefully with better diabetes control, this value will return to normal.    Continue to follow a low cholesterol diet.   Try to limit the amount of fried foods, fatty foods, butter, gravy, red meat, ice cream, cheese, and eggs in your diet, which are all high in cholesterol.  -------------------------------------------------------------------------------------------------------------------  BUN and creatinine are markers of kidney function. Your values are normal.  -------------------------------------------------------------------------------------------------------------------  ALT and AST are markers of liver function. Your values are likely elevated due to fatty deposition in the liver that can occur with weight gain. Hopefully with weight loss, these values will return to normal.  -------------------------------------------------------------------------------------------------------------------  I put an order directly into the [employer] system to repeat your labs in the 1-2 weeks prior to your next visit so just ask for the order under my name and you will receive a courtesy reminder through [employer] to have these drawn.

## 2022-04-29 NOTE — PATIENT INSTRUCTIONS
1) Please call [phone] to reset your [employer] password if you can't get in so I can send you the lab results. 2) Your Hemoglobin A1c (3 month test of blood sugar) was 10.5% up from 6.8% likely due to missing the evening dose of insulin more frequently than a year ago. Do your best not to miss any doses but if you do miss, you can take 50 units in the morning when you wake up. 3) Try taking metformin 3 tabs in the morning as long as you don't have any increase in GI side effects (stomach pain or nausea or diarrhea), then after 1-2 week, you can try to go to the max dose which is 4 tabs daily. If you do have more side effects, you can try splitting the dose to 2 tabs in the morning and 2 tab at night.

## 2022-09-26 RX ORDER — ATENOLOL 50 MG/1
TABLET ORAL
Qty: 90 TABLET | Refills: 3 | Status: SHIPPED | OUTPATIENT
Start: 2022-09-26

## 2022-10-10 RX ORDER — METFORMIN HYDROCHLORIDE 500 MG/1
TABLET, EXTENDED RELEASE ORAL
Qty: 180 TABLET | Refills: 3 | Status: SHIPPED | OUTPATIENT
Start: 2022-10-10

## 2022-10-14 DIAGNOSIS — I10 ESSENTIAL HYPERTENSION, BENIGN: ICD-10-CM

## 2022-10-14 DIAGNOSIS — E66.01 MORBID OBESITY WITH BMI OF 40.0-44.9, ADULT (HCC): ICD-10-CM

## 2022-10-14 DIAGNOSIS — E11.65 UNCONTROLLED TYPE 2 DIABETES MELLITUS WITH HYPERGLYCEMIA (HCC): ICD-10-CM

## 2022-10-14 DIAGNOSIS — E78.1 HIGH TRIGLYCERIDES: ICD-10-CM

## 2022-10-30 RX ORDER — AMITRIPTYLINE HYDROCHLORIDE 25 MG/1
25 TABLET, FILM COATED ORAL
Qty: 90 TABLET | Refills: 1 | Status: SHIPPED | OUTPATIENT
Start: 2022-10-30

## 2022-11-07 RX ORDER — FEBUXOSTAT 40 MG/1
40 TABLET, FILM COATED ORAL DAILY
Qty: 90 TABLET | Refills: 3 | Status: SHIPPED | OUTPATIENT
Start: 2022-11-07

## 2022-11-07 RX ORDER — LANSOPRAZOLE 30 MG/1
30 CAPSULE, DELAYED RELEASE ORAL
Qty: 180 CAPSULE | Refills: 3 | Status: SHIPPED | OUTPATIENT
Start: 2022-11-07

## 2022-11-07 NOTE — TELEPHONE ENCOUNTER
Last Visit: 4/21/21 with MD Tsering Islas  Next Appointment: none  Previous Refill Encounter(s): 12/18/19 Uloric #90 with 4 refills, 7/14/21 Prevacid #180 with 3 refills    Requested Prescriptions     Pending Prescriptions Disp Refills    febuxostat (ULORIC) 40 mg tab tablet 90 Tablet 3     Sig: Take 1 Tablet by mouth daily. lansoprazole (PREVACID) 30 mg capsule 180 Capsule 3     Sig: Take 1 Capsule by mouth Before breakfast and dinner. Indications: reflux         For [address] [place] [address] in place:   Recommendation Provided To:    Intervention Detail: New Rx: 2, reason: Patient Preference  Gap Closed?:   Intervention Accepted By:   Time Spent (min): 5

## 2023-02-13 ENCOUNTER — OFFICE VISIT (OUTPATIENT)
Dept: ENDOCRINOLOGY | Age: 53
End: 2023-02-13
Payer: COMMERCIAL

## 2023-02-13 VITALS
HEIGHT: 72 IN | HEART RATE: 72 BPM | DIASTOLIC BLOOD PRESSURE: 90 MMHG | SYSTOLIC BLOOD PRESSURE: 142 MMHG | BODY MASS INDEX: 34.02 KG/M2 | WEIGHT: 251.2 LBS

## 2023-02-13 DIAGNOSIS — I10 ESSENTIAL HYPERTENSION, BENIGN: ICD-10-CM

## 2023-02-13 DIAGNOSIS — E66.01 MORBID OBESITY WITH BMI OF 40.0-44.9, ADULT (HCC): ICD-10-CM

## 2023-02-13 DIAGNOSIS — E78.1 HIGH TRIGLYCERIDES: ICD-10-CM

## 2023-02-13 DIAGNOSIS — E11.65 UNCONTROLLED TYPE 2 DIABETES MELLITUS WITH HYPERGLYCEMIA (HCC): Primary | ICD-10-CM

## 2023-02-13 LAB — HBA1C MFR BLD HPLC: 8.7 %

## 2023-02-13 PROCEDURE — 83036 HEMOGLOBIN GLYCOSYLATED A1C: CPT | Performed by: INTERNAL MEDICINE

## 2023-02-13 PROCEDURE — 3080F DIAST BP >= 90 MM HG: CPT | Performed by: INTERNAL MEDICINE

## 2023-02-13 PROCEDURE — 3052F HG A1C>EQUAL 8.0%<EQUAL 9.0%: CPT | Performed by: INTERNAL MEDICINE

## 2023-02-13 PROCEDURE — 3077F SYST BP >= 140 MM HG: CPT | Performed by: INTERNAL MEDICINE

## 2023-02-13 PROCEDURE — 99214 OFFICE O/P EST MOD 30 MIN: CPT | Performed by: INTERNAL MEDICINE

## 2023-02-13 RX ORDER — INDOMETHACIN 50 MG/1
CAPSULE ORAL
Qty: 90 CAPSULE | Refills: 1 | Status: SHIPPED | OUTPATIENT
Start: 2023-02-13

## 2023-02-13 RX ORDER — METFORMIN HYDROCHLORIDE 500 MG/1
TABLET, EXTENDED RELEASE ORAL
Qty: 360 TABLET | Refills: 3 | Status: SHIPPED | OUTPATIENT
Start: 2023-02-13

## 2023-02-13 NOTE — PROGRESS NOTES
Chief Complaint   Patient presents with    Diabetes     PCP and pharmacy confirmed      History of Present Illness: Nghia Fowler is a 47 y.o. male here for follow up of diabetes. Weight down 1 lbs since last visit in 4/22. 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 Compliant with his insulin in the evening. Just taking metformin in the morning and never tried increasing his dose but willing to do so now. Compliant with BP and lipid regimen. Has a BP cuff at home and will check some readings as it was up today but I was an hour behind. Current Outpatient Medications   Medication Sig    febuxostat (ULORIC) 40 mg tab tablet Take 1 Tablet by mouth daily. lansoprazole (PREVACID) 30 mg capsule Take 1 Capsule by mouth Before breakfast and dinner. Indications: reflux    amitriptyline (ELAVIL) 25 mg tablet TAKE 1 TAB BY MOUTH NIGHTLY FOR NEUROPATHY    metFORMIN ER (GLUCOPHAGE XR) 500 mg tablet TAKE 2 TABLETS BY MOUTH EVERY DAY IN THE MORNING    atenoloL (TENORMIN) 50 mg tablet TAKE 1 TABLET BY MOUTH EVERY DAY    insulin NPH/insulin regular (NovoLIN 70/30 U-100 Insulin) 100 unit/mL (70-30) injection INJECT 50 UNITS BEFORE BREAKFAST (WHEN EATING DURING THE DAY)  UNITS AT DINNER OR AS DIRECTED UP  UNITS A DAY    DULoxetine (CYMBALTA) 60 mg capsule TAKE 1 CAP BY MOUTH DAILY. START AFTER FINISHING THE 30 MG DULOXETINE PRESCRIPTION    atorvastatin (LIPITOR) 20 mg tablet TAKE 1 TABLET BY MOUTH EVERY DAY FOR CHOLESTEROL    indomethacin (INDOCIN) 50 mg capsule TAKE 1 CAPSULE BY MOUTH THREE TIMES A DAY AS NEEDED    BD VEO INSULIN SYRINGE UF 1 mL 31 gauge x 15/64\" syrg USE AS DIRECTED TWICE DAILY    ONETOUCH VERIO strip USE TO TEST BLOOD SUGAR TWICE DAILY AS DIRECTED     No current facility-administered medications for this visit.      Allergies   Allergen Reactions    Penicillin G Anaphylaxis     \"almost killed me as a child according to my grandma (a nurse)\"     Review of Systems: PER HPI    Physical Examination:  Blood pressure (!) 142/90, pulse 72, height 6' (1.829 m), weight 251 lb 3.2 oz (113.9 kg). General: pleasant, no distress, good eye contact   Neck: no carotid bruits  Cardiovascular: regular, normal rate, nl s1 and s2, no m/r/g,   Respiratory: clear bilaterally  Integumentary: no edema,   Psychiatric: normal mood and affect    Data Reviewed:   Component      Latest Ref Rng & Units 2/13/2023           4:19 PM   Hemoglobin A1c (POC)      % 8.7       Assessment/Plan:     1. DM w/o complication type II, uncontrolled: his most recent Hgb A1c was 8.7% in 2/23 down from 10.5% in 4/22 up from 6.8% in 4/21 down from 9% in 5/20 up from 6.4% in 7/19 down from 9.8% in 1/19 up from 8.7% in 7/18 up from 8.5% in 1/18 down from 9.6% in 7/17 down from 10.8% in 2/17 down from 12.2% in 9/16 up from 11.9% in 8/16 up from 9.7% in 4/16 down from 9.9% in 4/15 up from 9.3% in 12/14 down from 10.6% in 8/14 up from 8.4% in 4/14 down from 11.2% in 10/13 up from 7.3% in 3/13 down from 7.7% in 1/13 down from 10.2% in August 2012. A1c is better but will still try to max out metformin. - cont novolin 70/30 insulin 100 units before dinner only   - increase metformin  mg to 2 tabs in am and 1 tab in pm for 1-2 weeks and then to 2 tabs bid  - check bs 2x daily  - optho UTD 6/12--due now  - foot exam done 4/22  - microalbumin nl 10/12, up to 75 in 4/15, down to 13 in 4/16, up to 50.3 in 7/17, down to 13 in 1/18 and normal ever since, last in 5/20  - check cmp and microalbumin now          2. Hypertension: his BP was above goal < 140/90  - cont atenolol 50 mg daily  - monitor home blood pressure readings          3. High triglycerides: Given DM, Goal LDL < 100, non-HDL < 130, and TG < 150.  and non- in 1/13,  and non- in 10/13,  and non- in 4/14, non- in 12/14.  and LDL 81 in 12/14.    in 4/15.  LDL 75 and  in 4/16. LDL 56 and  in 9/16. LDL 53 and  with weight loss. LDL 66 and  in 7/17. LDL 75 and  in 7/18. LDL 74 and  in 1/19. LDL 93 and  in 5/20. LDL 84 and  in 4/21. Started on lipitor 20 at that time and LDL 78 and  in 4/22  - cont lipitor 20 mg daily  - check lipids now        4. Obesity: Had lost 36 lbs so far since June 2013 on phentermine but weight up 10 lbs since 8/14. I restarted in 9/16 and wt down 23 lbs by 2/17 but up 5 lbs in 7/17 but had been out for 1 month. Restarted and had a \"rush\" sensation so he stopped this but wt down 8 lbs by 1/18. Restarted 1/2 tab daily and worked back up to 1 tab daily and wt down 8 lbs by 7/18. Up 3 lbs by 1/19. Down 9 lbs by 7/19. Down 27 lbs by 5/21. Off phentermine since then and up 13 lbs by 4/22 but down 1 lb by 2/23  - off phentermine 37.5 mg 1 tab daily      Patient Instructions   1) Per my records, it appear you have refills until November 2023 on the lansoprazole and uloric:    lansoprazole (PREVACID) 30 mg capsule 180 Capsule 3 11/7/2022     Sig - Route: Take 1 Capsule by mouth Before breakfast and dinner. Indications: reflux - Oral    Sent to pharmacy as: lansoprazole 30 mg capsule,delayed release (PREVACID)    E-Prescribing Status: Receipt confirmed by pharmacy (11/7/2022  6:41 PM EST)      febuxostat (ULORIC) 40 mg tab tablet 90 Tablet 3 11/7/2022     Sig - Route: Take 1 Tablet by mouth daily. - Oral    Sent to pharmacy as: febuxostat 40 mg tablet (ULORIC)    E-Prescribing Status: Receipt confirmed by pharmacy (11/7/2022  6:41 PM EST)      2) Your Hemoglobin A1c (3 month test of blood sugar) improved from 10.5% in 4/22 to 8.7%    3) Take 2 tabs of metformin in the morning and 1 tab at night for the next 1-2 weeks and if tolerating, take 2 tabs twice daily.       4) Start monitoring blood pressure about 2-3 times per week at alternating times either in the morning or evening after resting for 5 minutes and sitting upright in a chair with your arm at heart level. Please let me know if you are having readings over 140 on the top number or 90 on the bottom number. 5) I will call you or send you a letter with your lab results. Follow-up and Dispositions    Return in about 6 months (around 8/13/2023).                Copy sent to:  Flores Redd MD

## 2023-02-13 NOTE — PATIENT INSTRUCTIONS
1) Per my records, it appear you have refills until November 2023 on the lansoprazole and uloric:    lansoprazole (PREVACID) 30 mg capsule 180 Capsule 3 11/7/2022     Sig - Route: Take 1 Capsule by mouth Before breakfast and dinner. Indications: reflux - Oral    Sent to pharmacy as: lansoprazole 30 mg capsule,delayed release (PREVACID)    E-Prescribing Status: Receipt confirmed by pharmacy (11/7/2022  6:41 PM EST)      febuxostat (ULORIC) 40 mg tab tablet 90 Tablet 3 11/7/2022     Sig - Route: Take 1 Tablet by mouth daily. - Oral    Sent to pharmacy as: febuxostat 40 mg tablet (ULORIC)    E-Prescribing Status: Receipt confirmed by pharmacy (11/7/2022  6:41 PM EST)      2) Your Hemoglobin A1c (3 month test of blood sugar) improved from 10.5% in 4/22 to 8.7%    3) Take 2 tabs of metformin in the morning and 1 tab at night for the next 1-2 weeks and if tolerating, take 2 tabs twice daily. 4) Start monitoring blood pressure about 2-3 times per week at alternating times either in the morning or evening after resting for 5 minutes and sitting upright in a chair with your arm at heart level. Please let me know if you are having readings over 140 on the top number or 90 on the bottom number. 5) I will call you or send you a letter with your lab results.

## 2023-07-17 ENCOUNTER — TELEPHONE (OUTPATIENT)
Age: 53
End: 2023-07-17

## 2023-07-17 NOTE — TELEPHONE ENCOUNTER
Patient notes random lower abdominal pain in evening after eating. Last appt 4/20/21.   Patient scheduled for 8/30/23

## 2023-07-17 NOTE — TELEPHONE ENCOUNTER
Patient wife Jessica Martinez states that her  need to seen soon regarding severe abdominal pain he can be reached @ [phone]

## 2023-08-30 ENCOUNTER — OFFICE VISIT (OUTPATIENT)
Age: 53
End: 2023-08-30
Payer: COMMERCIAL

## 2023-08-30 VITALS
HEART RATE: 89 BPM | RESPIRATION RATE: 16 BRPM | OXYGEN SATURATION: 97 % | DIASTOLIC BLOOD PRESSURE: 77 MMHG | BODY MASS INDEX: 33.86 KG/M2 | SYSTOLIC BLOOD PRESSURE: 128 MMHG | TEMPERATURE: 97.8 F | HEIGHT: 72 IN | WEIGHT: 250 LBS

## 2023-08-30 DIAGNOSIS — R10.13 DYSPEPSIA: ICD-10-CM

## 2023-08-30 DIAGNOSIS — E78.1 HIGH TRIGLYCERIDES: ICD-10-CM

## 2023-08-30 DIAGNOSIS — Z12.5 PROSTATE CANCER SCREENING: ICD-10-CM

## 2023-08-30 DIAGNOSIS — E11.21 TYPE 2 DIABETES MELLITUS WITH NEPHROPATHY (HCC): ICD-10-CM

## 2023-08-30 DIAGNOSIS — I10 ESSENTIAL HYPERTENSION: Primary | ICD-10-CM

## 2023-08-30 PROCEDURE — 99213 OFFICE O/P EST LOW 20 MIN: CPT | Performed by: FAMILY MEDICINE

## 2023-08-30 PROCEDURE — 3078F DIAST BP <80 MM HG: CPT | Performed by: FAMILY MEDICINE

## 2023-08-30 PROCEDURE — 3074F SYST BP LT 130 MM HG: CPT | Performed by: FAMILY MEDICINE

## 2023-08-30 RX ORDER — BLOOD SUGAR DIAGNOSTIC
STRIP MISCELLANEOUS
COMMUNITY
Start: 2015-07-27

## 2023-08-30 RX ORDER — ATENOLOL 50 MG/1
50 TABLET ORAL DAILY
Qty: 90 TABLET | Refills: 3 | Status: SHIPPED | OUTPATIENT
Start: 2023-08-30

## 2023-08-30 RX ORDER — ATORVASTATIN CALCIUM 20 MG/1
TABLET, FILM COATED ORAL
Qty: 90 TABLET | Refills: 3 | Status: SHIPPED | OUTPATIENT
Start: 2023-08-30

## 2023-08-30 RX ORDER — FEBUXOSTAT 40 MG/1
40 TABLET, FILM COATED ORAL DAILY
Qty: 90 TABLET | Refills: 3 | Status: SHIPPED | OUTPATIENT
Start: 2023-08-30

## 2023-08-30 RX ORDER — AMITRIPTYLINE HYDROCHLORIDE 25 MG/1
25 TABLET, FILM COATED ORAL NIGHTLY
Qty: 90 TABLET | Refills: 1 | Status: SHIPPED | OUTPATIENT
Start: 2023-08-30

## 2023-08-30 RX ORDER — DULOXETIN HYDROCHLORIDE 60 MG/1
60 CAPSULE, DELAYED RELEASE ORAL DAILY
Qty: 90 CAPSULE | Refills: 3 | Status: SHIPPED | OUTPATIENT
Start: 2023-08-30

## 2023-08-30 RX ORDER — METFORMIN HYDROCHLORIDE 500 MG/1
TABLET, EXTENDED RELEASE ORAL
Qty: 360 TABLET | Refills: 3 | Status: SHIPPED | OUTPATIENT
Start: 2023-08-30

## 2023-08-30 RX ORDER — LANSOPRAZOLE 30 MG/1
30 CAPSULE, DELAYED RELEASE ORAL
Qty: 180 CAPSULE | Refills: 3 | Status: SHIPPED | OUTPATIENT
Start: 2023-08-30

## 2023-08-30 RX ORDER — SYRING-NEEDL,DISP,INSUL,0.3 ML 31GX15/64"
SYRINGE, EMPTY DISPOSABLE MISCELLANEOUS
COMMUNITY
Start: 2019-06-26

## 2023-08-30 NOTE — PROGRESS NOTES
Haylee Lopez (:  1970) is a 47 y.o. male,Established patient, here for evaluation of the following chief complaint(s):  Follow-up, Hypertension, Diabetes, and Cholesterol Problem         ASSESSMENT/PLAN:  1. Essential hypertension  -     Urinalysis with Microscopic; Future  -     Comprehensive Metabolic Panel; Future  -     CBC with Auto Differential; Future  2. Type 2 diabetes mellitus with nephropathy ([address])  3. High triglycerides  -     Lipid Panel; Future  4. Prostate cancer screening  -     PSA Screening; Future  5. Dyspepsia  -     lansoprazole (PREVACID) 30 MG delayed release capsule; Take 1 capsule by mouth 2 times daily (before meals), Disp-180 capsule, R-3Normal    Pt to take prevacid bid for 4 weeks- see if this clears up LUQ pain after eating   Return in about 6 months (around 2024). Subjective   SUBJECTIVE/OBJECTIVE:  HPIhas been having pain in L side of back after eating, usually comes on after eating dinner. Pains lasts for 5-10 minutes, then usually goes away. Also had a spell of R knee locking up last Friday after getting out of his truck. 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 Not really taking any meds for this. Has a prescription for prevacid, but doesn't take it regularly unless has heartburn. No attacks of gout since taking uloric. Needs diabetes, blood pressure and cholesterol checked. Sees Dr. Zandra Barry every 6 months. Review of Systems       Objective   Physical Exam  Cardiovascular:      Rate and Rhythm: Normal rate and regular rhythm. Heart sounds: Normal heart sounds. No murmur heard. Pulmonary:      Effort: Pulmonary effort is normal.      Breath sounds: Normal breath sounds. Abdominal:      General: Abdomen is flat. Bowel sounds are normal.      Palpations: Abdomen is soft. Musculoskeletal:      Right lower leg: No edema. Left lower leg: No edema.                 An electronic signature was used to

## 2023-08-30 NOTE — PROGRESS NOTES
Chief Complaint   Patient presents with    Follow-up    Hypertension    Diabetes    Cholesterol Problem         1. \"Have you been to the ER, urgent care clinic since your last visit? Hospitalized since your last visit? \" no    2. \"Have you seen or consulted any other health care providers outside of the [address] [place] [address] Avenue since your last visit? \" Correne Fuse Dr. Verner Lukes     3. For patients aged 43-73: Has the patient had a colonoscopy / FIT/ Cologuard? yes      If the patient is female:    4. For patients aged 43-66: Has the patient had a mammogram within the past 2 years? N/a      5. For patients aged 21-65: Has the patient had a pap smear?  N/a      Health Maintenance Due   Topic Date Due    Pneumococcal 0-64 years Vaccine (1 - PCV) Never done    Depression Monitoring  Never done    HIV screen  Never done    Diabetic retinal exam  Never done    Hepatitis C screen  Never done    Hepatitis B vaccine (1 of 3 - Risk 3-dose series) Never done    DTaP/Tdap/Td vaccine (1 - Tdap) Never done    Diabetic Alb to Cr ratio (uACR) test  05/04/2021    Shingles vaccine (1 of 2) Never done    Low dose CT lung screening &/or counseling  Never done    COVID-19 Vaccine (3 - Booster for Moderna series) 01/22/2022    Diabetic foot exam  04/29/2023    Lipids  04/29/2023    GFR test (Diabetes, CKD 3-4, OR last GFR 15-59)  04/29/2023    Flu vaccine (1) 08/01/2023

## 2023-08-31 LAB
ALBUMIN SERPL-MCNC: 4.2 G/DL (ref 3.5–5)
ALBUMIN/GLOB SERPL: 1.4 (ref 1.1–2.2)
ALP SERPL-CCNC: 79 U/L (ref 45–117)
ALT SERPL-CCNC: 52 U/L (ref 12–78)
ANION GAP SERPL CALC-SCNC: 5 MMOL/L (ref 5–15)
AST SERPL-CCNC: 32 U/L (ref 15–37)
BASOPHILS # BLD: 0.1 K/UL (ref 0–0.1)
BASOPHILS NFR BLD: 1 % (ref 0–1)
BILIRUB SERPL-MCNC: 0.6 MG/DL (ref 0.2–1)
BUN SERPL-MCNC: 13 MG/DL (ref 6–20)
BUN/CREAT SERPL: 12 (ref 12–20)
CALCIUM SERPL-MCNC: 9.3 MG/DL (ref 8.5–10.1)
CHLORIDE SERPL-SCNC: 101 MMOL/L (ref 97–108)
CHOLEST SERPL-MCNC: 111 MG/DL
CO2 SERPL-SCNC: 30 MMOL/L (ref 21–32)
CREAT SERPL-MCNC: 1.07 MG/DL (ref 0.7–1.3)
DIFFERENTIAL METHOD BLD: NORMAL
EOSINOPHIL # BLD: 0.3 K/UL (ref 0–0.4)
EOSINOPHIL NFR BLD: 3 % (ref 0–7)
ERYTHROCYTE [DISTWIDTH] IN BLOOD BY AUTOMATED COUNT: 12 % (ref 11.5–14.5)
GLOBULIN SER CALC-MCNC: 3.1 G/DL (ref 2–4)
GLUCOSE SERPL-MCNC: 165 MG/DL (ref 65–100)
HCT VFR BLD AUTO: 45.4 % (ref 36.6–50.3)
HDLC SERPL-MCNC: 32 MG/DL
HDLC SERPL: 3.5 (ref 0–5)
HGB BLD-MCNC: 15.7 G/DL (ref 12.1–17)
IMM GRANULOCYTES # BLD AUTO: 0 K/UL (ref 0–0.04)
IMM GRANULOCYTES NFR BLD AUTO: 0 % (ref 0–0.5)
LDLC SERPL CALC-MCNC: 45 MG/DL (ref 0–100)
LYMPHOCYTES # BLD: 3 K/UL (ref 0.8–3.5)
LYMPHOCYTES NFR BLD: 30 % (ref 12–49)
MCH RBC QN AUTO: 31.5 PG (ref 26–34)
MCHC RBC AUTO-ENTMCNC: 34.6 G/DL (ref 30–36.5)
MCV RBC AUTO: 91.2 FL (ref 80–99)
MONOCYTES # BLD: 1 K/UL (ref 0–1)
MONOCYTES NFR BLD: 10 % (ref 5–13)
NEUTS SEG # BLD: 5.7 K/UL (ref 1.8–8)
NEUTS SEG NFR BLD: 56 % (ref 32–75)
NRBC # BLD: 0 K/UL (ref 0–0.01)
NRBC BLD-RTO: 0 PER 100 WBC
PLATELET # BLD AUTO: 254 K/UL (ref 150–400)
PMV BLD AUTO: 10.2 FL (ref 8.9–12.9)
POTASSIUM SERPL-SCNC: 4.2 MMOL/L (ref 3.5–5.1)
PROT SERPL-MCNC: 7.3 G/DL (ref 6.4–8.2)
PSA SERPL-MCNC: 0.3 NG/ML (ref 0.01–4)
RBC # BLD AUTO: 4.98 M/UL (ref 4.1–5.7)
SODIUM SERPL-SCNC: 136 MMOL/L (ref 136–145)
TRIGL SERPL-MCNC: 170 MG/DL
VLDLC SERPL CALC-MCNC: 34 MG/DL
WBC # BLD AUTO: 10.2 K/UL (ref 4.1–11.1)

## 2023-12-26 ENCOUNTER — OFFICE VISIT (OUTPATIENT)
Age: 53
End: 2023-12-26
Payer: COMMERCIAL

## 2023-12-26 VITALS
HEART RATE: 90 BPM | RESPIRATION RATE: 20 BRPM | DIASTOLIC BLOOD PRESSURE: 84 MMHG | WEIGHT: 234.8 LBS | BODY MASS INDEX: 31.84 KG/M2 | SYSTOLIC BLOOD PRESSURE: 139 MMHG | OXYGEN SATURATION: 97 % | TEMPERATURE: 98.3 F

## 2023-12-26 DIAGNOSIS — C64.2 RENAL CELL CARCINOMA OF LEFT KIDNEY (HCC): ICD-10-CM

## 2023-12-26 DIAGNOSIS — R10.9 LEFT FLANK PAIN: Primary | ICD-10-CM

## 2023-12-26 DIAGNOSIS — J40 BRONCHITIS: ICD-10-CM

## 2023-12-26 DIAGNOSIS — R10.13 DYSPEPSIA: ICD-10-CM

## 2023-12-26 LAB
ALBUMIN SERPL-MCNC: 4 G/DL (ref 3.5–5)
ALBUMIN/GLOB SERPL: 1.3 (ref 1.1–2.2)
ALP SERPL-CCNC: 93 U/L (ref 45–117)
ALT SERPL-CCNC: 40 U/L (ref 12–78)
ANION GAP SERPL CALC-SCNC: 2 MMOL/L (ref 5–15)
APPEARANCE UR: CLEAR
AST SERPL-CCNC: 24 U/L (ref 15–37)
BACTERIA URNS QL MICRO: NEGATIVE /HPF
BASOPHILS # BLD: 0.1 K/UL (ref 0–0.1)
BASOPHILS NFR BLD: 1 % (ref 0–1)
BILIRUB SERPL-MCNC: 0.4 MG/DL (ref 0.2–1)
BILIRUB UR QL: NEGATIVE
BUN SERPL-MCNC: 12 MG/DL (ref 6–20)
BUN/CREAT SERPL: 13 (ref 12–20)
CALCIUM SERPL-MCNC: 8.5 MG/DL (ref 8.5–10.1)
CHLORIDE SERPL-SCNC: 103 MMOL/L (ref 97–108)
CO2 SERPL-SCNC: 32 MMOL/L (ref 21–32)
COLOR UR: NORMAL
CREAT SERPL-MCNC: 0.89 MG/DL (ref 0.7–1.3)
DIFFERENTIAL METHOD BLD: NORMAL
EOSINOPHIL # BLD: 0.3 K/UL (ref 0–0.4)
EOSINOPHIL NFR BLD: 3 % (ref 0–7)
EPITH CASTS URNS QL MICRO: NORMAL /LPF
ERYTHROCYTE [DISTWIDTH] IN BLOOD BY AUTOMATED COUNT: 11.8 % (ref 11.5–14.5)
GLOBULIN SER CALC-MCNC: 3.2 G/DL (ref 2–4)
GLUCOSE SERPL-MCNC: 169 MG/DL (ref 65–100)
GLUCOSE UR STRIP.AUTO-MCNC: NEGATIVE MG/DL
HCT VFR BLD AUTO: 45.8 % (ref 36.6–50.3)
HGB BLD-MCNC: 16.2 G/DL (ref 12.1–17)
HGB UR QL STRIP: NEGATIVE
HYALINE CASTS URNS QL MICRO: NORMAL /LPF (ref 0–5)
IMM GRANULOCYTES # BLD AUTO: 0 K/UL (ref 0–0.04)
IMM GRANULOCYTES NFR BLD AUTO: 0 % (ref 0–0.5)
KETONES UR QL STRIP.AUTO: NEGATIVE MG/DL
LEUKOCYTE ESTERASE UR QL STRIP.AUTO: NEGATIVE
LYMPHOCYTES # BLD: 2.5 K/UL (ref 0.8–3.5)
LYMPHOCYTES NFR BLD: 25 % (ref 12–49)
MCH RBC QN AUTO: 31.4 PG (ref 26–34)
MCHC RBC AUTO-ENTMCNC: 35.4 G/DL (ref 30–36.5)
MCV RBC AUTO: 88.8 FL (ref 80–99)
MONOCYTES # BLD: 0.7 K/UL (ref 0–1)
MONOCYTES NFR BLD: 7 % (ref 5–13)
NEUTS SEG # BLD: 6.3 K/UL (ref 1.8–8)
NEUTS SEG NFR BLD: 64 % (ref 32–75)
NITRITE UR QL STRIP.AUTO: NEGATIVE
NRBC # BLD: 0 K/UL (ref 0–0.01)
NRBC BLD-RTO: 0 PER 100 WBC
PH UR STRIP: 6 (ref 5–8)
PLATELET # BLD AUTO: 266 K/UL (ref 150–400)
PMV BLD AUTO: 10 FL (ref 8.9–12.9)
POTASSIUM SERPL-SCNC: 3.9 MMOL/L (ref 3.5–5.1)
PROT SERPL-MCNC: 7.2 G/DL (ref 6.4–8.2)
PROT UR STRIP-MCNC: NEGATIVE MG/DL
RBC # BLD AUTO: 5.16 M/UL (ref 4.1–5.7)
RBC #/AREA URNS HPF: NORMAL /HPF (ref 0–5)
SODIUM SERPL-SCNC: 137 MMOL/L (ref 136–145)
SP GR UR REFRACTOMETRY: 1.01 (ref 1–1.03)
UROBILINOGEN UR QL STRIP.AUTO: 0.2 EU/DL (ref 0.2–1)
WBC # BLD AUTO: 9.8 K/UL (ref 4.1–11.1)
WBC URNS QL MICRO: NORMAL /HPF (ref 0–4)

## 2023-12-26 PROCEDURE — 3075F SYST BP GE 130 - 139MM HG: CPT | Performed by: FAMILY MEDICINE

## 2023-12-26 PROCEDURE — 3079F DIAST BP 80-89 MM HG: CPT | Performed by: FAMILY MEDICINE

## 2023-12-26 PROCEDURE — 99214 OFFICE O/P EST MOD 30 MIN: CPT | Performed by: FAMILY MEDICINE

## 2023-12-26 RX ORDER — LANSOPRAZOLE 30 MG/1
30 CAPSULE, DELAYED RELEASE ORAL
Qty: 60 CAPSULE | Refills: 2 | Status: SHIPPED | OUTPATIENT
Start: 2023-12-26

## 2023-12-26 RX ORDER — DEXTROMETHORPHAN HYDROBROMIDE AND PROMETHAZINE HYDROCHLORIDE 15; 6.25 MG/5ML; MG/5ML
5 SYRUP ORAL 4 TIMES DAILY PRN
Qty: 120 ML | Refills: 1 | Status: SHIPPED | OUTPATIENT
Start: 2023-12-26 | End: 2024-01-07

## 2023-12-26 RX ORDER — DOXYCYCLINE HYCLATE 100 MG
100 TABLET ORAL 2 TIMES DAILY
Qty: 20 TABLET | Refills: 0 | Status: SHIPPED | OUTPATIENT
Start: 2023-12-26 | End: 2024-01-05

## 2023-12-26 NOTE — PROGRESS NOTES
Chief Complaint   Patient presents with    Lower Back Pain     After eating        1. \"Have you been to the ER, urgent care clinic since your last visit? Hospitalized since your last visit? \" No    2. \"Have you seen or consulted any other health care providers outside of the [address] [place] [address] since your last visit? \" No     3. For patients aged 43-73: Has the patient had a colonoscopy / FIT/ Cologuard?  Yes      Health Maintenance Due   Topic Date Due    Hepatitis B vaccine (1 of 3 - 3-dose series) Never done    Pneumococcal 0-64 years Vaccine (1 - PCV) Never done    Depression Monitoring  Never done    HIV screen  Never done    Diabetic retinal exam  Never done    Hepatitis C screen  Never done    DTaP/Tdap/Td vaccine (1 - Tdap) Never done    Diabetic Alb to Cr ratio (uACR) test  05/04/2021    Shingles vaccine (1 of 2) Never done    Low dose CT lung screening &/or counseling  Never done    Diabetic foot exam  04/29/2023    Flu vaccine (1) 08/01/2023    COVID-19 Vaccine (3 - 2023-24 season) 09/01/2023

## 2023-12-26 NOTE — PROGRESS NOTES
Derek Vasquez (:  1970) is a 47 y.o. male,Established patient, here for evaluation of the following chief complaint(s):  Lower Back Pain (After eating )         ASSESSMENT/PLAN:  1. Left flank pain  -     CT ABDOMEN PELVIS W WO CONTRAST Additional Contrast? Radiologist Recommendation; Future  -     Comprehensive Metabolic Panel; Future  -     Urinalysis with Microscopic; Future  -     CBC with Auto Differential; Future  2. Renal cell carcinoma of left kidney (HCC)  -     CT ABDOMEN PELVIS W WO CONTRAST Additional Contrast? Radiologist Recommendation; Future  3. Dyspepsia  -     lansoprazole (PREVACID) 30 MG delayed release capsule; Take 1 capsule by mouth 2 times daily (before meals) For flank pain, Disp-60 capsule, R-2Normal  4. Bronchitis  -     doxycycline hyclate (VIBRA-TABS) 100 MG tablet; Take 1 tablet by mouth 2 times daily for 10 days, Disp-20 tablet, R-0Normal  -     promethazine-dextromethorphan (PROMETHAZINE-DM) 6.25-15 MG/5ML syrup; Take 5 mLs by mouth 4 times daily as needed for Cough, Disp-120 mL, R-1Normal      No follow-ups on file. Subjective   SUBJECTIVE/OBJECTIVE:  HPI Gets a pain in his L gluteal area after eating a large meal, can be dull or sharp, can last up to one hour. Pain gets better if leans forward. No diarrhea, constipation. No blood in stools. Pain doesn't seem to be related to bending or lifting. No hematuria or dark urine. Hx removal of a renal cell cancer. Has also had a cough for over one week productive at times. Review of Systems       Objective   Physical Exam  Cardiovascular:      Rate and Rhythm: Normal rate and regular rhythm. Heart sounds: Normal heart sounds. No murmur heard. Pulmonary:      Effort: Pulmonary effort is normal.      Breath sounds: Normal breath sounds. Abdominal:      General: Abdomen is flat. Bowel sounds are normal.      Palpations: Abdomen is soft. Musculoskeletal:      Right lower leg: No edema.       Left lower leg: No

## 2024-01-03 ENCOUNTER — TELEPHONE (OUTPATIENT)
Age: 54
End: 2024-01-03

## 2024-01-05 ENCOUNTER — OFFICE VISIT (OUTPATIENT)
Age: 54
End: 2024-01-05
Payer: COMMERCIAL

## 2024-01-05 DIAGNOSIS — E11.21 TYPE 2 DIABETES MELLITUS WITH NEPHROPATHY (HCC): Primary | ICD-10-CM

## 2024-01-05 LAB — HBA1C MFR BLD: 6.8 %

## 2024-01-05 PROCEDURE — 83036 HEMOGLOBIN GLYCOSYLATED A1C: CPT | Performed by: FAMILY MEDICINE

## 2024-01-10 ENCOUNTER — TELEPHONE (OUTPATIENT)
Age: 54
End: 2024-01-10

## 2024-01-27 ENCOUNTER — HOSPITAL ENCOUNTER (OUTPATIENT)
Facility: HOSPITAL | Age: 54
End: 2024-01-27
Attending: FAMILY MEDICINE
Payer: COMMERCIAL

## 2024-01-27 DIAGNOSIS — R10.9 LEFT FLANK PAIN: ICD-10-CM

## 2024-01-27 DIAGNOSIS — C64.2 RENAL CELL CARCINOMA OF LEFT KIDNEY (HCC): ICD-10-CM

## 2024-01-27 PROCEDURE — 74178 CT ABD&PLV WO CNTR FLWD CNTR: CPT

## 2024-01-27 PROCEDURE — 6360000004 HC RX CONTRAST MEDICATION: Performed by: FAMILY MEDICINE

## 2024-01-27 RX ADMIN — IOPAMIDOL 100 ML: 755 INJECTION, SOLUTION INTRAVENOUS at 08:59

## 2024-01-29 ENCOUNTER — TELEPHONE (OUTPATIENT)
Age: 54
End: 2024-01-29

## 2024-02-19 RX ORDER — INDOMETHACIN 50 MG/1
50 CAPSULE ORAL DAILY
Qty: 90 CAPSULE | Refills: 1 | Status: SHIPPED | OUTPATIENT
Start: 2024-02-19

## 2024-04-30 RX ORDER — AMITRIPTYLINE HYDROCHLORIDE 25 MG/1
25 TABLET, FILM COATED ORAL NIGHTLY
Qty: 90 TABLET | Refills: 1 | Status: SHIPPED | OUTPATIENT
Start: 2024-04-30

## 2024-04-30 NOTE — TELEPHONE ENCOUNTER
Last office visit with pcp 12/26/23.  Next office visit  with pcp none scheduled.  Will send 6 month follow up reminder by [facility].

## 2024-09-03 RX ORDER — ATORVASTATIN CALCIUM 20 MG/1
TABLET, FILM COATED ORAL
Qty: 90 TABLET | Refills: 3 | Status: SHIPPED | OUTPATIENT
Start: 2024-09-03

## 2024-11-03 DIAGNOSIS — R10.13 DYSPEPSIA: ICD-10-CM

## 2024-11-04 RX ORDER — LANSOPRAZOLE 30 MG/1
CAPSULE, DELAYED RELEASE ORAL
Qty: 180 CAPSULE | Refills: 0 | Status: SHIPPED | OUTPATIENT
Start: 2024-11-04

## 2024-11-04 NOTE — TELEPHONE ENCOUNTER
Last appointment: 12/26/23  Next appointment: 2/29/24 pt cancelled appt  Previous refill encounter(s): 12/26/23 #60 with 2 refills    Requested Prescriptions     Pending Prescriptions Disp Refills    lansoprazole (PREVACID) 30 MG delayed release capsule [Pharmacy Med Name: LANSOPRAZOLE 30 MG CAP[*]] 180 capsule 0     Sig: TAKE ONE CAPSULE BY MOUTH TWICE A DAY BEFORE MEALS FOR FLANK PAIN         For Pharmacy Admin Tracking Only    Program: Medication Refill  CPA in place:    Recommendation Provided To:   Intervention Detail: New Rx: 1, reason: Patient Preference  Intervention Accepted By:   Gap Closed?:    Time Spent (min): 5

## 2025-01-13 NOTE — TELEPHONE ENCOUNTER
Last office visit with pcp 1/5/24.  Next office visit  with pcp none scheduled. 2nd appt reminder letter sent.

## 2025-01-14 DIAGNOSIS — Z12.11 COLON CANCER SCREENING: Primary | ICD-10-CM

## 2025-01-14 RX ORDER — NITROFURANTOIN 25; 75 MG/1; MG/1
100 CAPSULE ORAL 2 TIMES DAILY
Qty: 14 CAPSULE | Refills: 0 | Status: SHIPPED | OUTPATIENT
Start: 2025-01-14 | End: 2025-01-21

## 2025-02-04 ENCOUNTER — OFFICE VISIT (OUTPATIENT)
Age: 55
End: 2025-02-04
Payer: COMMERCIAL

## 2025-02-04 VITALS
OXYGEN SATURATION: 96 % | TEMPERATURE: 96.6 F | BODY MASS INDEX: 33.1 KG/M2 | DIASTOLIC BLOOD PRESSURE: 74 MMHG | WEIGHT: 244.4 LBS | HEART RATE: 81 BPM | HEIGHT: 72 IN | SYSTOLIC BLOOD PRESSURE: 101 MMHG | RESPIRATION RATE: 16 BRPM

## 2025-02-04 DIAGNOSIS — Z12.5 PROSTATE CANCER SCREENING: ICD-10-CM

## 2025-02-04 DIAGNOSIS — E78.1 HIGH TRIGLYCERIDES: ICD-10-CM

## 2025-02-04 DIAGNOSIS — I10 ESSENTIAL HYPERTENSION: ICD-10-CM

## 2025-02-04 DIAGNOSIS — E11.21 TYPE 2 DIABETES MELLITUS WITH NEPHROPATHY (HCC): Primary | ICD-10-CM

## 2025-02-04 DIAGNOSIS — Z12.11 COLON CANCER SCREENING: ICD-10-CM

## 2025-02-04 LAB
GLUCOSE, POC: 164 MG/DL
HBA1C MFR BLD: 7.1 %

## 2025-02-04 PROCEDURE — 82962 GLUCOSE BLOOD TEST: CPT | Performed by: FAMILY MEDICINE

## 2025-02-04 PROCEDURE — 83036 HEMOGLOBIN GLYCOSYLATED A1C: CPT | Performed by: FAMILY MEDICINE

## 2025-02-04 PROCEDURE — 3078F DIAST BP <80 MM HG: CPT | Performed by: FAMILY MEDICINE

## 2025-02-04 PROCEDURE — 3074F SYST BP LT 130 MM HG: CPT | Performed by: FAMILY MEDICINE

## 2025-02-04 PROCEDURE — 99214 OFFICE O/P EST MOD 30 MIN: CPT | Performed by: FAMILY MEDICINE

## 2025-02-04 SDOH — ECONOMIC STABILITY: FOOD INSECURITY: WITHIN THE PAST 12 MONTHS, YOU WORRIED THAT YOUR FOOD WOULD RUN OUT BEFORE YOU GOT MONEY TO BUY MORE.: NEVER TRUE

## 2025-02-04 SDOH — ECONOMIC STABILITY: FOOD INSECURITY: WITHIN THE PAST 12 MONTHS, THE FOOD YOU BOUGHT JUST DIDN'T LAST AND YOU DIDN'T HAVE MONEY TO GET MORE.: NEVER TRUE

## 2025-02-04 ASSESSMENT — PATIENT HEALTH QUESTIONNAIRE - PHQ9
4. FEELING TIRED OR HAVING LITTLE ENERGY: NOT AT ALL
SUM OF ALL RESPONSES TO PHQ9 QUESTIONS 1 & 2: 0
SUM OF ALL RESPONSES TO PHQ QUESTIONS 1-9: 0
2. FEELING DOWN, DEPRESSED OR HOPELESS: NOT AT ALL
5. POOR APPETITE OR OVEREATING: NOT AT ALL
SUM OF ALL RESPONSES TO PHQ QUESTIONS 1-9: 0
8. MOVING OR SPEAKING SO SLOWLY THAT OTHER PEOPLE COULD HAVE NOTICED. OR THE OPPOSITE, BEING SO FIGETY OR RESTLESS THAT YOU HAVE BEEN MOVING AROUND A LOT MORE THAN USUAL: NOT AT ALL
1. LITTLE INTEREST OR PLEASURE IN DOING THINGS: NOT AT ALL
10. IF YOU CHECKED OFF ANY PROBLEMS, HOW DIFFICULT HAVE THESE PROBLEMS MADE IT FOR YOU TO DO YOUR WORK, TAKE CARE OF THINGS AT HOME, OR GET ALONG WITH OTHER PEOPLE: NOT DIFFICULT AT ALL
3. TROUBLE FALLING OR STAYING ASLEEP: NOT AT ALL
6. FEELING BAD ABOUT YOURSELF - OR THAT YOU ARE A FAILURE OR HAVE LET YOURSELF OR YOUR FAMILY DOWN: NOT AT ALL
7. TROUBLE CONCENTRATING ON THINGS, SUCH AS READING THE NEWSPAPER OR WATCHING TELEVISION: NOT AT ALL
9. THOUGHTS THAT YOU WOULD BE BETTER OFF DEAD, OR OF HURTING YOURSELF: NOT AT ALL

## 2025-02-04 NOTE — PROGRESS NOTES
Chief Complaint   Patient presents with    Hypertension    Diabetes    Cholesterol Problem    Follow-up       \"Have you been to the ER, urgent care clinic since your last visit?  Hospitalized since your last visit?\"    NO    “Have you seen or consulted any other health care providers outside of [facility] since your last visit?”    NO            Click Here for Release of Records Request       There were no vitals filed for this visit.   Health Maintenance Due   Topic Date Due    Pneumococcal 0-64 years Vaccine (1 of 2 - PCV) Never done    HIV screen  Never done    Diabetic retinal exam  Never done    Hepatitis C screen  Never done    Hepatitis B vaccine (1 of 3 - 19+ 3-dose series) Never done    DTaP/Tdap/Td vaccine (1 - Tdap) Never done    Diabetic Alb to Cr ratio (uACR) test  2021    Shingles vaccine (1 of 2) Never done    Lung Cancer Screening &/or Counseling  Never done    Diabetic foot exam  2023    Flu vaccine (1) 2024    Lipids  2024    COVID-19 Vaccine (3 - -24 season) 2024    Depression Monitoring  2024    GFR test (Diabetes, CKD 3-4, OR last GFR 15-59)  2024    A1C test (Diabetic or Prediabetic)  2025        The patient, Gerard Newberry, identity was verified by name and .

## 2025-02-04 NOTE — ASSESSMENT & PLAN NOTE
Orders:    AMB POC GLUCOSE BLOOD, BY GLUCOSE MONITORING DEVICE    AMB POC HEMOGLOBIN A1C    [facility] DIABETES FOOT EXAM    Albumin/Creatinine Ratio, Urine; Future

## 2025-02-04 NOTE — PROGRESS NOTES
Gerard Newberry (:  1970) is a 54 y.o. male,Established patient, here for evaluation of the following chief complaint(s):  Hypertension, Diabetes, Cholesterol Problem, and Follow-up         Assessment & Plan  Type 2 diabetes mellitus with nephropathy (HCC)       Orders:    AMB POC GLUCOSE BLOOD, BY GLUCOSE MONITORING DEVICE    AMB POC HEMOGLOBIN A1C    HM DIABETES FOOT EXAM    Albumin/Creatinine Ratio, Urine; Future    Essential hypertension   Chronic, at goal (stable), continue current treatment plan    Orders:    CBC with Auto Differential; Future    Comprehensive Metabolic Panel; Future    Comprehensive Metabolic Panel    CBC with Auto Differential    High triglycerides       Orders:    Lipid Panel; Future    Lipid Panel    Prostate cancer screening       Orders:    PSA Screening; Future    PSA Screening    Colon cancer screening       Orders:    Tang [place] MD, Gastroenterology, [place]    Recheck in 6 months.   No follow-ups on file.       Subjective   HPI in for diabetes, blood pressure, cholesterol check. No complaints of chest pain, shortness of breath, TIAs, claudication or edema. Needs colonoscopy and psa checked.       Review of Systems       Objective   Physical Exam  Cardiovascular:      Rate and Rhythm: Normal rate and regular rhythm.      Heart sounds: Normal heart sounds. No murmur heard.  Pulmonary:      Effort: Pulmonary effort is normal.      Breath sounds: Normal breath sounds.   Abdominal:      General: Abdomen is flat. Bowel sounds are normal.      Palpations: Abdomen is soft.   Musculoskeletal:      Right lower leg: No edema.      Left lower leg: No edema.                  An electronic signature was used to authenticate this note.    --Luther Walker MD

## 2025-02-06 LAB
ALBUMIN SERPL-MCNC: 4.7 G/DL (ref 3.8–4.9)
ALP SERPL-CCNC: 84 IU/L (ref 44–121)
ALT SERPL-CCNC: 38 IU/L (ref 0–44)
AST SERPL-CCNC: 29 IU/L (ref 0–40)
BASOPHILS # BLD AUTO: 0.1 X10E3/UL (ref 0–0.2)
BASOPHILS NFR BLD AUTO: 1 %
BILIRUB SERPL-MCNC: 0.6 MG/DL (ref 0–1.2)
BUN SERPL-MCNC: 12 MG/DL (ref 6–24)
BUN/CREAT SERPL: 13 (ref 9–20)
CALCIUM SERPL-MCNC: 9.6 MG/DL (ref 8.7–10.2)
CHLORIDE SERPL-SCNC: 100 MMOL/L (ref 96–106)
CHOLEST SERPL-MCNC: 122 MG/DL (ref 100–199)
CO2 SERPL-SCNC: 24 MMOL/L (ref 20–29)
CREAT SERPL-MCNC: 0.96 MG/DL (ref 0.76–1.27)
EGFRCR SERPLBLD CKD-EPI 2021: 95 ML/MIN/1.73
EOSINOPHIL # BLD AUTO: 1.7 X10E3/UL (ref 0–0.4)
EOSINOPHIL NFR BLD AUTO: 16 %
ERYTHROCYTE [DISTWIDTH] IN BLOOD BY AUTOMATED COUNT: 12.2 % (ref 11.6–15.4)
GLOBULIN SER CALC-MCNC: 2.3 G/DL (ref 1.5–4.5)
GLUCOSE SERPL-MCNC: 135 MG/DL (ref 70–99)
HCT VFR BLD AUTO: 47.3 % (ref 37.5–51)
HDLC SERPL-MCNC: 30 MG/DL
HGB BLD-MCNC: 16.4 G/DL (ref 13–17.7)
IMM GRANULOCYTES # BLD AUTO: 0 X10E3/UL (ref 0–0.1)
IMM GRANULOCYTES NFR BLD AUTO: 0 %
IMP & REVIEW OF LAB RESULTS: NORMAL
LDLC SERPL CALC-MCNC: 69 MG/DL (ref 0–99)
LYMPHOCYTES # BLD AUTO: 2.9 X10E3/UL (ref 0.7–3.1)
LYMPHOCYTES NFR BLD AUTO: 28 %
MCH RBC QN AUTO: 32.5 PG (ref 26.6–33)
MCHC RBC AUTO-ENTMCNC: 34.7 G/DL (ref 31.5–35.7)
MCV RBC AUTO: 94 FL (ref 79–97)
MONOCYTES # BLD AUTO: 0.9 X10E3/UL (ref 0.1–0.9)
MONOCYTES NFR BLD AUTO: 8 %
NEUTROPHILS # BLD AUTO: 4.9 X10E3/UL (ref 1.4–7)
NEUTROPHILS NFR BLD AUTO: 47 %
PLATELET # BLD AUTO: 258 X10E3/UL (ref 150–450)
POTASSIUM SERPL-SCNC: 4.5 MMOL/L (ref 3.5–5.2)
PROT SERPL-MCNC: 7 G/DL (ref 6–8.5)
PSA SERPL-MCNC: 0.2 NG/ML (ref 0–4)
RBC # BLD AUTO: 5.04 X10E6/UL (ref 4.14–5.8)
SODIUM SERPL-SCNC: 141 MMOL/L (ref 134–144)
TRIGL SERPL-MCNC: 129 MG/DL (ref 0–149)
VLDLC SERPL CALC-MCNC: 23 MG/DL (ref 5–40)
WBC # BLD AUTO: 10.4 X10E3/UL (ref 3.4–10.8)

## 2025-02-10 RX ORDER — ATORVASTATIN CALCIUM 20 MG/1
TABLET, FILM COATED ORAL
Qty: 90 TABLET | Refills: 3 | Status: SHIPPED | OUTPATIENT
Start: 2025-02-10

## 2025-02-10 NOTE — TELEPHONE ENCOUNTER
Pt's wife is requesting this be sent to [facility].    Last appointment: 2/4/25  Next appointment: 8/8/25  Previous refill encounter(s): 1/17/25 & 9/3/24    Requested Prescriptions     Pending Prescriptions Disp Refills    amitriptyline (ELAVIL) 25 MG tablet 90 tablet 3     Sig: Take 1 tablet by mouth nightly    atorvastatin (LIPITOR) 20 MG tablet 90 tablet 3     Sig: TAKE ONE TABLET BY MOUTH ONE TIME DAILY FOR CHOLESTEROL         For Pharmacy Admin Tracking Only    Program: Medication Refill  CPA in place:    Recommendation Provided To:   Intervention Detail: New Rx: 2, reason: Patient Preference  Intervention Accepted By:   Gap Closed?:    Time Spent (min): 5

## (undated) DEVICE — CONTAINER SPEC 20 ML LID NEUT BUFF FORMALIN 10 % POLYPR STS

## (undated) DEVICE — Device

## (undated) DEVICE — SYR 10ML LUER LOK 1/5ML GRAD --

## (undated) DEVICE — SNARE ENDOSCP M L240CM W27MM SHTH DIA2.4MM CHN 2.8MM OVL

## (undated) DEVICE — KENDALL RADIOLUCENT FOAM MONITORING ELECTRODE RECTANGULAR SHAPE: Brand: KENDALL

## (undated) DEVICE — NEONATAL-ADULT SPO2 SENSOR: Brand: NELLCOR

## (undated) DEVICE — SET ADMIN 16ML TBNG L100IN 2 Y INJ SITE IV PIGGY BK DISP

## (undated) DEVICE — SOLIDIFIER MEDC 1200ML -- CONVERT TO 356117

## (undated) DEVICE — BASIN EMSIS 16OZ GRAPHITE PLAS KID SHP MOLD GRAD FOR ORAL

## (undated) DEVICE — SYR 3ML LL TIP 1/10ML GRAD --

## (undated) DEVICE — Z DISCONTINUED PER MEDLINE LINE GAS SAMPLING O2/CO2 LNG AD 13 FT NSL W/ TBNG FILTERLINE

## (undated) DEVICE — NEEDLE HYPO 18GA L1.5IN PNK S STL HUB POLYPR SHLD REG BVL

## (undated) DEVICE — CATH IV AUTOGRD BC PNK 20GA 25 -- INSYTE

## (undated) DEVICE — TOWEL 4 PLY TISS 19X30 SUE WHT

## (undated) DEVICE — 1200 GUARD II KIT W/5MM TUBE W/O VAC TUBE: Brand: GUARDIAN